# Patient Record
Sex: MALE | Race: WHITE | NOT HISPANIC OR LATINO | URBAN - METROPOLITAN AREA
[De-identification: names, ages, dates, MRNs, and addresses within clinical notes are randomized per-mention and may not be internally consistent; named-entity substitution may affect disease eponyms.]

---

## 2017-05-01 ENCOUNTER — OUTPATIENT (OUTPATIENT)
Dept: OUTPATIENT SERVICES | Facility: HOSPITAL | Age: 77
LOS: 1 days | End: 2017-05-01
Payer: MEDICAID

## 2017-05-03 DIAGNOSIS — R69 ILLNESS, UNSPECIFIED: ICD-10-CM

## 2017-07-01 PROCEDURE — G9001: CPT

## 2022-08-03 ENCOUNTER — INPATIENT (INPATIENT)
Facility: HOSPITAL | Age: 82
LOS: 6 days | Discharge: HOME | DRG: 308 | End: 2022-08-10
Attending: STUDENT IN AN ORGANIZED HEALTH CARE EDUCATION/TRAINING PROGRAM | Admitting: HOSPITALIST
Payer: MEDICARE

## 2022-08-03 VITALS
WEIGHT: 199.96 LBS | RESPIRATION RATE: 18 BRPM | SYSTOLIC BLOOD PRESSURE: 150 MMHG | HEIGHT: 66 IN | HEART RATE: 57 BPM | OXYGEN SATURATION: 95 % | DIASTOLIC BLOOD PRESSURE: 70 MMHG | TEMPERATURE: 98 F

## 2022-08-03 DIAGNOSIS — R00.1 BRADYCARDIA, UNSPECIFIED: ICD-10-CM

## 2022-08-03 LAB
ALBUMIN SERPL ELPH-MCNC: 4.1 G/DL — SIGNIFICANT CHANGE UP (ref 3.3–5)
ALBUMIN SERPL ELPH-MCNC: 4.3 G/DL — SIGNIFICANT CHANGE UP (ref 3.3–5)
ALP SERPL-CCNC: 49 U/L — SIGNIFICANT CHANGE UP (ref 40–120)
ALP SERPL-CCNC: 54 U/L — SIGNIFICANT CHANGE UP (ref 40–120)
ALT FLD-CCNC: 11 U/L — SIGNIFICANT CHANGE UP (ref 10–45)
ALT FLD-CCNC: 13 U/L — SIGNIFICANT CHANGE UP (ref 10–45)
ANION GAP SERPL CALC-SCNC: 11 MMOL/L — SIGNIFICANT CHANGE UP (ref 5–17)
ANION GAP SERPL CALC-SCNC: 8 MMOL/L — SIGNIFICANT CHANGE UP (ref 5–17)
APPEARANCE UR: CLEAR — SIGNIFICANT CHANGE UP
AST SERPL-CCNC: 14 U/L — SIGNIFICANT CHANGE UP (ref 10–40)
AST SERPL-CCNC: 14 U/L — SIGNIFICANT CHANGE UP (ref 10–40)
BACTERIA # UR AUTO: NEGATIVE — SIGNIFICANT CHANGE UP
BASE EXCESS BLDV CALC-SCNC: -1 MMOL/L — SIGNIFICANT CHANGE UP (ref -2–2)
BASOPHILS # BLD AUTO: 0.03 K/UL — SIGNIFICANT CHANGE UP (ref 0–0.2)
BASOPHILS NFR BLD AUTO: 0.4 % — SIGNIFICANT CHANGE UP (ref 0–2)
BILIRUB SERPL-MCNC: 0.3 MG/DL — SIGNIFICANT CHANGE UP (ref 0.2–1.2)
BILIRUB SERPL-MCNC: 0.4 MG/DL — SIGNIFICANT CHANGE UP (ref 0.2–1.2)
BILIRUB UR-MCNC: NEGATIVE — SIGNIFICANT CHANGE UP
BUN SERPL-MCNC: 41 MG/DL — HIGH (ref 7–23)
BUN SERPL-MCNC: 43 MG/DL — HIGH (ref 7–23)
CA-I SERPL-SCNC: 1.24 MMOL/L — SIGNIFICANT CHANGE UP (ref 1.15–1.33)
CALCIUM SERPL-MCNC: 9.2 MG/DL — SIGNIFICANT CHANGE UP (ref 8.4–10.5)
CALCIUM SERPL-MCNC: 9.6 MG/DL — SIGNIFICANT CHANGE UP (ref 8.4–10.5)
CHLORIDE BLDV-SCNC: 107 MMOL/L — SIGNIFICANT CHANGE UP (ref 96–108)
CHLORIDE SERPL-SCNC: 105 MMOL/L — SIGNIFICANT CHANGE UP (ref 96–108)
CHLORIDE SERPL-SCNC: 107 MMOL/L — SIGNIFICANT CHANGE UP (ref 96–108)
CO2 BLDV-SCNC: 26 MMOL/L — SIGNIFICANT CHANGE UP (ref 22–26)
CO2 SERPL-SCNC: 23 MMOL/L — SIGNIFICANT CHANGE UP (ref 22–31)
CO2 SERPL-SCNC: 24 MMOL/L — SIGNIFICANT CHANGE UP (ref 22–31)
COLOR SPEC: SIGNIFICANT CHANGE UP
CREAT SERPL-MCNC: 1.67 MG/DL — HIGH (ref 0.5–1.3)
CREAT SERPL-MCNC: 1.77 MG/DL — HIGH (ref 0.5–1.3)
DIFF PNL FLD: ABNORMAL
EGFR: 38 ML/MIN/1.73M2 — LOW
EGFR: 41 ML/MIN/1.73M2 — LOW
EOSINOPHIL # BLD AUTO: 0.08 K/UL — SIGNIFICANT CHANGE UP (ref 0–0.5)
EOSINOPHIL NFR BLD AUTO: 1.1 % — SIGNIFICANT CHANGE UP (ref 0–6)
EPI CELLS # UR: 0 /HPF — SIGNIFICANT CHANGE UP
GAS PNL BLDV: 138 MMOL/L — SIGNIFICANT CHANGE UP (ref 136–145)
GAS PNL BLDV: SIGNIFICANT CHANGE UP
GAS PNL BLDV: SIGNIFICANT CHANGE UP
GLUCOSE BLDV-MCNC: 106 MG/DL — HIGH (ref 70–99)
GLUCOSE SERPL-MCNC: 104 MG/DL — HIGH (ref 70–99)
GLUCOSE SERPL-MCNC: 113 MG/DL — HIGH (ref 70–99)
GLUCOSE UR QL: NEGATIVE — SIGNIFICANT CHANGE UP
HCO3 BLDV-SCNC: 25 MMOL/L — SIGNIFICANT CHANGE UP (ref 22–29)
HCT VFR BLD CALC: 37.2 % — LOW (ref 39–50)
HCT VFR BLDA CALC: 35 % — LOW (ref 39–51)
HGB BLD CALC-MCNC: 11.5 G/DL — LOW (ref 12.6–17.4)
HGB BLD-MCNC: 12.2 G/DL — LOW (ref 13–17)
HYALINE CASTS # UR AUTO: 0 /LPF — SIGNIFICANT CHANGE UP (ref 0–2)
IMM GRANULOCYTES NFR BLD AUTO: 0.4 % — SIGNIFICANT CHANGE UP (ref 0–1.5)
KETONES UR-MCNC: NEGATIVE — SIGNIFICANT CHANGE UP
LACTATE BLDV-MCNC: 0.7 MMOL/L — SIGNIFICANT CHANGE UP (ref 0.7–2)
LEUKOCYTE ESTERASE UR-ACNC: NEGATIVE — SIGNIFICANT CHANGE UP
LYMPHOCYTES # BLD AUTO: 1.33 K/UL — SIGNIFICANT CHANGE UP (ref 1–3.3)
LYMPHOCYTES # BLD AUTO: 18.9 % — SIGNIFICANT CHANGE UP (ref 13–44)
MAGNESIUM SERPL-MCNC: 2.1 MG/DL — SIGNIFICANT CHANGE UP (ref 1.6–2.6)
MCHC RBC-ENTMCNC: 29.1 PG — SIGNIFICANT CHANGE UP (ref 27–34)
MCHC RBC-ENTMCNC: 32.8 GM/DL — SIGNIFICANT CHANGE UP (ref 32–36)
MCV RBC AUTO: 88.8 FL — SIGNIFICANT CHANGE UP (ref 80–100)
MONOCYTES # BLD AUTO: 0.57 K/UL — SIGNIFICANT CHANGE UP (ref 0–0.9)
MONOCYTES NFR BLD AUTO: 8.1 % — SIGNIFICANT CHANGE UP (ref 2–14)
NEUTROPHILS # BLD AUTO: 4.98 K/UL — SIGNIFICANT CHANGE UP (ref 1.8–7.4)
NEUTROPHILS NFR BLD AUTO: 71.1 % — SIGNIFICANT CHANGE UP (ref 43–77)
NITRITE UR-MCNC: NEGATIVE — SIGNIFICANT CHANGE UP
NRBC # BLD: 0 /100 WBCS — SIGNIFICANT CHANGE UP (ref 0–0)
PCO2 BLDV: 45 MMHG — SIGNIFICANT CHANGE UP (ref 42–55)
PH BLDV: 7.35 — SIGNIFICANT CHANGE UP (ref 7.32–7.43)
PH UR: 6 — SIGNIFICANT CHANGE UP (ref 5–8)
PLATELET # BLD AUTO: 147 K/UL — LOW (ref 150–400)
PO2 BLDV: 36 MMHG — SIGNIFICANT CHANGE UP (ref 25–45)
POTASSIUM BLDV-SCNC: 4.5 MMOL/L — SIGNIFICANT CHANGE UP (ref 3.5–5.1)
POTASSIUM SERPL-MCNC: 4.5 MMOL/L — SIGNIFICANT CHANGE UP (ref 3.5–5.3)
POTASSIUM SERPL-MCNC: 4.8 MMOL/L — SIGNIFICANT CHANGE UP (ref 3.5–5.3)
POTASSIUM SERPL-SCNC: 4.5 MMOL/L — SIGNIFICANT CHANGE UP (ref 3.5–5.3)
POTASSIUM SERPL-SCNC: 4.8 MMOL/L — SIGNIFICANT CHANGE UP (ref 3.5–5.3)
PROT SERPL-MCNC: 6.2 G/DL — SIGNIFICANT CHANGE UP (ref 6–8.3)
PROT SERPL-MCNC: 6.8 G/DL — SIGNIFICANT CHANGE UP (ref 6–8.3)
PROT UR-MCNC: NEGATIVE — SIGNIFICANT CHANGE UP
RBC # BLD: 4.19 M/UL — LOW (ref 4.2–5.8)
RBC # FLD: 12.6 % — SIGNIFICANT CHANGE UP (ref 10.3–14.5)
RBC CASTS # UR COMP ASSIST: 7 /HPF — HIGH (ref 0–4)
SAO2 % BLDV: 62.7 % — LOW (ref 67–88)
SODIUM SERPL-SCNC: 139 MMOL/L — SIGNIFICANT CHANGE UP (ref 135–145)
SODIUM SERPL-SCNC: 139 MMOL/L — SIGNIFICANT CHANGE UP (ref 135–145)
SP GR SPEC: 1.01 — SIGNIFICANT CHANGE UP (ref 1.01–1.02)
UROBILINOGEN FLD QL: NEGATIVE — SIGNIFICANT CHANGE UP
WBC # BLD: 7.02 K/UL — SIGNIFICANT CHANGE UP (ref 3.8–10.5)
WBC # FLD AUTO: 7.02 K/UL — SIGNIFICANT CHANGE UP (ref 3.8–10.5)
WBC UR QL: 1 /HPF — SIGNIFICANT CHANGE UP (ref 0–5)

## 2022-08-03 PROCEDURE — 99285 EMERGENCY DEPT VISIT HI MDM: CPT | Mod: GC

## 2022-08-03 PROCEDURE — 93010 ELECTROCARDIOGRAM REPORT: CPT | Mod: GC

## 2022-08-03 PROCEDURE — 76775 US EXAM ABDO BACK WALL LIM: CPT | Mod: 26

## 2022-08-03 RX ORDER — ATROPINE SULFATE 0.1 MG/ML
0.5 SYRINGE (ML) INJECTION ONCE
Refills: 0 | Status: DISCONTINUED | OUTPATIENT
Start: 2022-08-03 | End: 2022-08-10

## 2022-08-03 RX ORDER — LANOLIN ALCOHOL/MO/W.PET/CERES
3 CREAM (GRAM) TOPICAL AT BEDTIME
Refills: 0 | Status: DISCONTINUED | OUTPATIENT
Start: 2022-08-03 | End: 2022-08-10

## 2022-08-03 RX ORDER — SODIUM CHLORIDE 9 MG/ML
500 INJECTION INTRAMUSCULAR; INTRAVENOUS; SUBCUTANEOUS ONCE
Refills: 0 | Status: COMPLETED | OUTPATIENT
Start: 2022-08-03 | End: 2022-08-03

## 2022-08-03 RX ORDER — ONDANSETRON 8 MG/1
4 TABLET, FILM COATED ORAL EVERY 8 HOURS
Refills: 0 | Status: DISCONTINUED | OUTPATIENT
Start: 2022-08-03 | End: 2022-08-10

## 2022-08-03 RX ORDER — ACETAMINOPHEN 500 MG
650 TABLET ORAL EVERY 6 HOURS
Refills: 0 | Status: DISCONTINUED | OUTPATIENT
Start: 2022-08-03 | End: 2022-08-10

## 2022-08-03 RX ADMIN — SODIUM CHLORIDE 500 MILLILITER(S): 9 INJECTION INTRAMUSCULAR; INTRAVENOUS; SUBCUTANEOUS at 23:04

## 2022-08-03 NOTE — ED PROVIDER NOTE - PROGRESS NOTE DETAILS
James Yin M.D. (PGY-1): pt seen by urology. casas placed. dirused 1 L. Pt observed after this. сергей down to the high 30s about 30 min after casas placement. no symptoms. sinus сергей on monitor and on ecg. e- panel repeated, unremarkable. troponin unremarkable. admission to medicine for bradycardia of unknown origin.

## 2022-08-03 NOTE — PROCEDURE NOTE - ADDITIONAL PROCEDURE DETAILS
Urology called after unsuccessful casas attempts. On exam pt noted to have a very tight pin point phimosis. Gently dilated with urojet and successfully placed a 12f silicone casas. 1100cc of clear yellow urine drained.    - Monitor for post obstructive diuresis (urine output >200/hr for 3 hours)  - If POD, Repeat BMP q6 hours and replete electrolytes as needed  - Drink to thirst, place 2 liters of water at bedside at all times  - Otherwise may follow up with his urologist Dr. Ovalles who recommended circumcision a few years ago.   - Home with casas, outpatient TOV with Dr. Ovalles

## 2022-08-03 NOTE — ED PROVIDER NOTE - ATTENDING APP SHARED VISIT CONTRIBUTION OF CARE
I, Thomas Oconnro, performed a history and physical exam of the patient and discussed their management with the resident and/or advanced care provider. I reviewed the resident and/or advanced care provider's note and agree with the documented findings and plan of care except where noted. I was present and available for all procedures.     agree with above. I wrote MDM

## 2022-08-03 NOTE — ED PROVIDER NOTE - CARE PLAN
1 Principal Discharge DX:	Urinary retention   Principal Discharge DX:	Bradycardia   Principal Discharge DX:	Bradycardia  Secondary Diagnosis:	Urinary retention

## 2022-08-03 NOTE — ED PROVIDER NOTE - PHYSICAL EXAMINATION
GEN: Pt in NAD, non-toxic, appears uncomfortable.  PSYCH: Affect appropriate.  EYES: Sclera white w/o injection.   ENT: Head NCAT. MMM. Neck supple FROM.  RESP: CTA b/l, no wheezes, rales, or rhonchi.   CARDIAC: RRR, clear distinct S1, S2, no appreciable murmurs.  ABD: Abdomen soft, +fullness of suprapubic region with mild ttp. No CVAT b/l.  VASC: 2+ radial and dorsalis pedis pulses b/l. No edema or calf tenderness.  SKIN: No rashes on the trunk. PHYSICAL EXAM:  GENERAL: non-toxic appearing; in no respiratory distress  HEAD Atraumatic, Normocephalic  NECK: No JVD; trachea midline  EYES: PERRL, EOMs intact b/l w/out deficits; normal conjunctiva  CHEST/LUNG: CTAB no wheezes/rhonchi/rales  HEART: RRR no murmur/gallops/rubs  ABDOMEN: soft, mildly distended; surpapubic fullness but no tenderness; no cva tenderness  EXTREMITIES: No LE edema, +2 radial pulses b/l, +2 DP/PT pulses b/l  MUSCULOSKELETAL: FROM of all 4 extremities;  NERVOUS SYSTEM:  A&Ox3, No motor deficits or sensory deficits; CNII-XII intact; Speech is fluent and appropriate  SKIN:  Warm and dry as visualized

## 2022-08-03 NOTE — ED PROVIDER NOTE - NSTIMEPROVIDERCAREINITIATE_GEN_ER
I would be ok with either sending her to PT - can sign existing order. Or seeing her in same day slot to try Epley maneuver myself and see if we can get her some relief.  Can do meclizine OTC   03-Aug-2022 18:04

## 2022-08-03 NOTE — ED PROVIDER NOTE - NSFOLLOWUPINSTRUCTIONS_ED_ALL_ED_FT
1) Follow-up with your primary care provider in 1-2 days.      Follow-up with urology in 1 week.    2) Continue to take all medications as prescribed.      Read attached discharge information on leg bag.    3) Rest and drink plenty of fluids.    4) Return to the ER for any new or worsening symptoms, difficulty passing urine through the catheter, bloody urine, fever, chills, abdominal pain, or if any other concerns. 1) Follow-up with your primary care provider in 1-2 days.      Follow-up with urology in 1 week.    Additionally, you had an ultrasound in the emergency room of the kidney and bladder. Please bring the results of this ultrasound with you to discuss with your PCP. It is recommended that you have an outpatient ultrasound or MRI to further evaluate the right kidney.    2) Continue to take all medications as prescribed.      Read attached discharge information on leg bag.    3) Rest and drink plenty of fluids.    4) Return to the ER for any new or worsening symptoms, difficulty passing urine through the catheter, bloody urine, fever, chills, abdominal pain, or if any other concerns.

## 2022-08-03 NOTE — ED ADULT NURSE REASSESSMENT NOTE - NS ED NURSE REASSESS COMMENT FT1
Verified patient's identify with two forms of patient identification (correct spelling of full name and date of birth). Patient evaluated by "[facility]" provider. Laboratory specimen ordered and obtained via peripheral IV access device. Laboratory specimen sent to laboratory. 18 gauge peripheral IV access device placed to the LEFT AC by AUDREY Guy RN. Peripheral IV access device tolerated well, positive blood return, flushing without resistance, and secured with securement device, transparent dressing, and tape.

## 2022-08-03 NOTE — ED PROVIDER NOTE - CLINICAL SUMMARY MEDICAL DECISION MAKING FREE TEXT BOX
Thomas Oconnor MD. pt with cad s/p cabg, prostate CA s/p RT >10 years ago, presents for urinary retention since 4am today with surpapubic discomfort. pt appeasr uncomfroatlbe likely 2/2 urinary retnetion. pt with surpaubic fullness but no tenderness. no cva tenderness. lungs cta. pocus showing at least 550cc of urine retained in bladder. will place casas, chedck labs for bran, ua to eval for infection, reassess.    progress note: notified by RN that pt has pinpoint urethral opening. resident and I evaluated and attempted to pass casas. unable to and thus, urology was consulted. they were able to pass a casas in and immediately, about 1.1L of urine was drained with relief of pt's discomfort. while evaluating the pt, pt noted to be bradycardic to the 30s. placed on pads. pt maintaining BPs and is asymptoamtic. unclear etiology however, possibly post-diuresis related vs lyte abrnomatlies. 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

## 2022-08-03 NOTE — ED PROVIDER NOTE - RAPID ASSESSMENT
81 y/o M w/ PMHx HTN, prediabetes, CABG and prostate CA, presents to the ED c/o urinary retention w/ pain x1day. Last urine output was at 4AM. No hx of prior catheterizations.  Pt has been taking Tylenol for relief. Denies any other acute complaints.     Silvana MELARA ([employer]) have documented this rapid assessment note under the dictation of Marsha Saunders (PA) which has been reviewed and affirmed to be accurate. Patient was seen as a QPA patient. The patient will be seen and further worked up in the main emergency department and their care will be completed by the main emergency department team along with a thorough physical exam. Receiving team will follow up on labs, analgesia, any clinical imaging, reassess and disposition as clinically indicated, all decisions regarding the progression of care will be made at their discretion. 83 y/o M w/ PMHx HTN, prediabetes, CABG and prostate CA, presents to the ED c/o urinary retention w/ pain x1day. Last urine output was at 4AM. No hx of prior urinary catheterizations.  Pt has been taking Tylenol for relief. Feels pressure to his lower abdomen. Has been eating/drinking normally today. No other complaints. No fevers/chills.     Silvana MELARA (Real) have documented this rapid assessment note under the dictation of Marsha Saunders (PA) which has been reviewed and affirmed to be accurate. Patient was seen as a QPA patient. The patient will be seen and further worked up in the main emergency department and their care will be completed by the main emergency department team along with a thorough physical exam. Receiving team will follow up on labs, analgesia, any clinical imaging, reassess and disposition as clinically indicated, all decisions regarding the progression of care will be made at their discretion.    Marsha MELARA PA-C, personally performed the service described in the documentation recorded by the scribe in my presence, and it accurately and completely records my words and actions.

## 2022-08-03 NOTE — ED PROVIDER NOTE - OBJECTIVE STATEMENT
83 y/o M w/ PMHx HTN, prediabetes, CAD s/p CABG, and  remote hx of prostate CA >10 yrs ago s/p RT, presents to the ED c/o urinary retention w/ pain x1day. Last urine output was at 4AM - several drops. No hx of prior urinary catheterizations.  Pt has been taking Tylenol for relief. Pt reports pressure in the lower abdomen. Pt denies CP, SOB, n/v, trauma, recent illness or urinary sxs. Pt eating and drinking normally today, ate watermelon.

## 2022-08-03 NOTE — ED ADULT NURSE REASSESSMENT NOTE - NS ED NURSE REASSESS COMMENT FT1
Munoz attempted and unable to pass due to closure of meatus and inability to retract foreskin. MD García attempted and unable, urology called.

## 2022-08-04 DIAGNOSIS — I10 ESSENTIAL (PRIMARY) HYPERTENSION: ICD-10-CM

## 2022-08-04 DIAGNOSIS — I25.10 ATHEROSCLEROTIC HEART DISEASE OF NATIVE CORONARY ARTERY WITHOUT ANGINA PECTORIS: ICD-10-CM

## 2022-08-04 DIAGNOSIS — E78.5 HYPERLIPIDEMIA, UNSPECIFIED: ICD-10-CM

## 2022-08-04 DIAGNOSIS — N13.9 OBSTRUCTIVE AND REFLUX UROPATHY, UNSPECIFIED: ICD-10-CM

## 2022-08-04 DIAGNOSIS — I49.8 OTHER SPECIFIED CARDIAC ARRHYTHMIAS: ICD-10-CM

## 2022-08-04 DIAGNOSIS — N18.30 CHRONIC KIDNEY DISEASE, STAGE 3 UNSPECIFIED: ICD-10-CM

## 2022-08-04 LAB
ALBUMIN SERPL ELPH-MCNC: 4 G/DL — SIGNIFICANT CHANGE UP (ref 3.3–5)
ALP SERPL-CCNC: 51 U/L — SIGNIFICANT CHANGE UP (ref 40–120)
ALT FLD-CCNC: 13 U/L — SIGNIFICANT CHANGE UP (ref 10–45)
ANION GAP SERPL CALC-SCNC: 11 MMOL/L — SIGNIFICANT CHANGE UP (ref 5–17)
AST SERPL-CCNC: 15 U/L — SIGNIFICANT CHANGE UP (ref 10–40)
BASOPHILS # BLD AUTO: 0.04 K/UL — SIGNIFICANT CHANGE UP (ref 0–0.2)
BASOPHILS NFR BLD AUTO: 0.7 % — SIGNIFICANT CHANGE UP (ref 0–2)
BILIRUB SERPL-MCNC: 0.4 MG/DL — SIGNIFICANT CHANGE UP (ref 0.2–1.2)
BUN SERPL-MCNC: 37 MG/DL — HIGH (ref 7–23)
CALCIUM SERPL-MCNC: 9.4 MG/DL — SIGNIFICANT CHANGE UP (ref 8.4–10.5)
CHLORIDE SERPL-SCNC: 108 MMOL/L — SIGNIFICANT CHANGE UP (ref 96–108)
CO2 SERPL-SCNC: 25 MMOL/L — SIGNIFICANT CHANGE UP (ref 22–31)
CREAT ?TM UR-MCNC: 71 MG/DL — SIGNIFICANT CHANGE UP
CREAT SERPL-MCNC: 1.43 MG/DL — HIGH (ref 0.5–1.3)
CULTURE RESULTS: NO GROWTH — SIGNIFICANT CHANGE UP
EGFR: 49 ML/MIN/1.73M2 — LOW
EOSINOPHIL # BLD AUTO: 0.11 K/UL — SIGNIFICANT CHANGE UP (ref 0–0.5)
EOSINOPHIL NFR BLD AUTO: 1.8 % — SIGNIFICANT CHANGE UP (ref 0–6)
GLUCOSE SERPL-MCNC: 99 MG/DL — SIGNIFICANT CHANGE UP (ref 70–99)
HCT VFR BLD CALC: 35.9 % — LOW (ref 39–50)
HGB BLD-MCNC: 12 G/DL — LOW (ref 13–17)
IMM GRANULOCYTES NFR BLD AUTO: 0.3 % — SIGNIFICANT CHANGE UP (ref 0–1.5)
LYMPHOCYTES # BLD AUTO: 1.47 K/UL — SIGNIFICANT CHANGE UP (ref 1–3.3)
LYMPHOCYTES # BLD AUTO: 24.3 % — SIGNIFICANT CHANGE UP (ref 13–44)
MCHC RBC-ENTMCNC: 29.3 PG — SIGNIFICANT CHANGE UP (ref 27–34)
MCHC RBC-ENTMCNC: 33.4 GM/DL — SIGNIFICANT CHANGE UP (ref 32–36)
MCV RBC AUTO: 87.8 FL — SIGNIFICANT CHANGE UP (ref 80–100)
MONOCYTES # BLD AUTO: 0.53 K/UL — SIGNIFICANT CHANGE UP (ref 0–0.9)
MONOCYTES NFR BLD AUTO: 8.7 % — SIGNIFICANT CHANGE UP (ref 2–14)
NEUTROPHILS # BLD AUTO: 3.89 K/UL — SIGNIFICANT CHANGE UP (ref 1.8–7.4)
NEUTROPHILS NFR BLD AUTO: 64.2 % — SIGNIFICANT CHANGE UP (ref 43–77)
NRBC # BLD: 0 /100 WBCS — SIGNIFICANT CHANGE UP (ref 0–0)
OSMOLALITY UR: 454 MOS/KG — SIGNIFICANT CHANGE UP (ref 300–900)
PLATELET # BLD AUTO: 130 K/UL — LOW (ref 150–400)
POTASSIUM SERPL-MCNC: 4.4 MMOL/L — SIGNIFICANT CHANGE UP (ref 3.5–5.3)
POTASSIUM SERPL-SCNC: 4.4 MMOL/L — SIGNIFICANT CHANGE UP (ref 3.5–5.3)
PROT ?TM UR-MCNC: 12 MG/DL — SIGNIFICANT CHANGE UP (ref 0–12)
PROT SERPL-MCNC: 6.5 G/DL — SIGNIFICANT CHANGE UP (ref 6–8.3)
PROT/CREAT UR-RTO: 0.2 RATIO — SIGNIFICANT CHANGE UP (ref 0–0.2)
RBC # BLD: 4.09 M/UL — LOW (ref 4.2–5.8)
RBC # FLD: 12.5 % — SIGNIFICANT CHANGE UP (ref 10.3–14.5)
SARS-COV-2 RNA SPEC QL NAA+PROBE: SIGNIFICANT CHANGE UP
SODIUM SERPL-SCNC: 144 MMOL/L — SIGNIFICANT CHANGE UP (ref 135–145)
SODIUM UR-SCNC: 72 MMOL/L — SIGNIFICANT CHANGE UP
SPECIMEN SOURCE: SIGNIFICANT CHANGE UP
TSH SERPL-MCNC: 3.89 UIU/ML — SIGNIFICANT CHANGE UP (ref 0.27–4.2)
UUN UR-MCNC: 680 MG/DL — SIGNIFICANT CHANGE UP
WBC # BLD: 6.06 K/UL — SIGNIFICANT CHANGE UP (ref 3.8–10.5)
WBC # FLD AUTO: 6.06 K/UL — SIGNIFICANT CHANGE UP (ref 3.8–10.5)

## 2022-08-04 PROCEDURE — 93306 TTE W/DOPPLER COMPLETE: CPT | Mod: 26

## 2022-08-04 PROCEDURE — 76770 US EXAM ABDO BACK WALL COMP: CPT | Mod: 26

## 2022-08-04 PROCEDURE — 99223 1ST HOSP IP/OBS HIGH 75: CPT

## 2022-08-04 RX ORDER — ROSUVASTATIN CALCIUM 5 MG/1
1 TABLET ORAL
Qty: 0 | Refills: 0 | DISCHARGE

## 2022-08-04 RX ORDER — DOXAZOSIN MESYLATE 4 MG
1 TABLET ORAL
Qty: 0 | Refills: 0 | DISCHARGE

## 2022-08-04 RX ORDER — VALSARTAN 80 MG/1
160 TABLET ORAL DAILY
Refills: 0 | Status: DISCONTINUED | OUTPATIENT
Start: 2022-08-04 | End: 2022-08-05

## 2022-08-04 RX ORDER — ASPIRIN/CALCIUM CARB/MAGNESIUM 324 MG
81 TABLET ORAL DAILY
Refills: 0 | Status: DISCONTINUED | OUTPATIENT
Start: 2022-08-04 | End: 2022-08-10

## 2022-08-04 RX ORDER — ATORVASTATIN CALCIUM 80 MG/1
80 TABLET, FILM COATED ORAL AT BEDTIME
Refills: 0 | Status: DISCONTINUED | OUTPATIENT
Start: 2022-08-04 | End: 2022-08-10

## 2022-08-04 RX ORDER — ARIPIPRAZOLE 15 MG/1
5 TABLET ORAL DAILY
Refills: 0 | Status: DISCONTINUED | OUTPATIENT
Start: 2022-08-04 | End: 2022-08-10

## 2022-08-04 RX ORDER — CHLORTHALIDONE 50 MG
25 TABLET ORAL DAILY
Refills: 0 | Status: DISCONTINUED | OUTPATIENT
Start: 2022-08-04 | End: 2022-08-05

## 2022-08-04 RX ORDER — ASPIRIN/CALCIUM CARB/MAGNESIUM 324 MG
1 TABLET ORAL
Qty: 0 | Refills: 0 | DISCHARGE

## 2022-08-04 RX ORDER — POLYETHYLENE GLYCOL 3350 17 G/17G
17 POWDER, FOR SOLUTION ORAL DAILY
Refills: 0 | Status: DISCONTINUED | OUTPATIENT
Start: 2022-08-04 | End: 2022-08-10

## 2022-08-04 RX ORDER — AMLODIPINE BESYLATE 2.5 MG/1
5 TABLET ORAL DAILY
Refills: 0 | Status: DISCONTINUED | OUTPATIENT
Start: 2022-08-04 | End: 2022-08-04

## 2022-08-04 RX ORDER — ENOXAPARIN SODIUM 100 MG/ML
40 INJECTION SUBCUTANEOUS EVERY 24 HOURS
Refills: 0 | Status: DISCONTINUED | OUTPATIENT
Start: 2022-08-04 | End: 2022-08-08

## 2022-08-04 RX ORDER — ARIPIPRAZOLE 15 MG/1
1 TABLET ORAL
Qty: 0 | Refills: 0 | DISCHARGE

## 2022-08-04 RX ORDER — DOXAZOSIN MESYLATE 4 MG
4 TABLET ORAL AT BEDTIME
Refills: 0 | Status: DISCONTINUED | OUTPATIENT
Start: 2022-08-04 | End: 2022-08-10

## 2022-08-04 RX ADMIN — Medication 81 MILLIGRAM(S): at 13:19

## 2022-08-04 RX ADMIN — SODIUM CHLORIDE 500 MILLILITER(S): 9 INJECTION INTRAMUSCULAR; INTRAVENOUS; SUBCUTANEOUS at 00:37

## 2022-08-04 RX ADMIN — ARIPIPRAZOLE 5 MILLIGRAM(S): 15 TABLET ORAL at 13:21

## 2022-08-04 RX ADMIN — ATORVASTATIN CALCIUM 80 MILLIGRAM(S): 80 TABLET, FILM COATED ORAL at 21:44

## 2022-08-04 RX ADMIN — AMLODIPINE BESYLATE 5 MILLIGRAM(S): 2.5 TABLET ORAL at 06:43

## 2022-08-04 RX ADMIN — ENOXAPARIN SODIUM 40 MILLIGRAM(S): 100 INJECTION SUBCUTANEOUS at 13:20

## 2022-08-04 RX ADMIN — Medication 4 MILLIGRAM(S): at 21:44

## 2022-08-04 RX ADMIN — Medication 0.2 MILLIGRAM(S): at 06:41

## 2022-08-04 RX ADMIN — Medication 25 MILLIGRAM(S): at 06:43

## 2022-08-04 RX ADMIN — VALSARTAN 160 MILLIGRAM(S): 80 TABLET ORAL at 06:41

## 2022-08-04 NOTE — H&P ADULT - PROBLEM SELECTOR PLAN 3
baseline Cr 1.6-1.8  Monitor UO, BUN/Cr, volume status, acid-base balance, electrolytes   urine studies (UA, Cr, Lytes, Osm) + calculate FENa/FEUrea, renal/bladder US   avoid nephrotoxic agents; appropriate dose adjustments for all renally cleared medications   judicious IVF + electrolyte supplementation as needed to maintain goal

## 2022-08-04 NOTE — PROVIDER CONTACT NOTE (OTHER) - RECOMMENDATIONS
Notify ACP of sustaining HR. Provider to RN for R2 pads on pt. Continue to monitor pt
FRANTZ Yao made aware

## 2022-08-04 NOTE — H&P ADULT - PROBLEM SELECTOR PLAN 4
s/p CABG 7 yrs ago  asymptomatic, trop negative, no ekg findings suggestive of acute ischemia  continue home asa, statin; hold AVN blockers for now given bradyarrhythmia

## 2022-08-04 NOTE — PROVIDER CONTACT NOTE (OTHER) - ASSESSMENT
Pt remains asymptomatic with no changes in mentation.
pt. HR 40s on tele. Pt is A&O X4. denies chest pain/ SOB/ discomfort. BP, temp, and O2 WNL.

## 2022-08-04 NOTE — H&P ADULT - NSHPREVIEWOFSYSTEMS_GEN_ALL_CORE
CONSTITUTIONAL: No fever. no weakness  ENMT:  No sinus or throat pain  RESPIRATORY: No cough, wheezing, chills or hemoptysis; No shortness of breath  CARDIOVASCULAR: No chest pain, palpitations, dizziness, or leg swelling  GASTROINTESTINAL: No abdominal or epigastric pain. No nausea, vomiting, or hematemesis; No diarrhea or constipation. No melena or hematochezia.  GENITOURINARY: No dysuria or incontinence  NEUROLOGICAL: No headaches, memory loss, loss of strength, numbness, or tremors  SKIN: No rashes,  No hives or eczema  ENDOCRINE: No heat or cold intolerance; No hair loss  MUSCULOSKELETAL: No joint pain or swelling; No muscle, back, or extremity pain  PSYCHIATRIC: No depression, anxiety, mood swings, or difficulty sleeping  HEME/LYMPH: No easy bruising, or bleeding gums; no enlarged LN

## 2022-08-04 NOTE — H&P ADULT - NSHPPHYSICALEXAM_GEN_ALL_CORE
T(C): 36.4 (08-04-22 @ 00:15), Max: 36.9 (08-03-22 @ 17:48)  HR: 45 (08-04-22 @ 00:15) (45 - 57)  BP: 155/74 (08-04-22 @ 00:15) (150/70 - 155/74)  RR: 17 (08-04-22 @ 00:15) (17 - 18)  SpO2: 95% (08-04-22 @ 00:15) (95% - 95%)  GENERAL: NAD, lying in bed comfortably  EYES: EOMI, PERRLA; conjunctiva and sclera clear  ENMT: Moist oral mucosa, no pharyngeal injection or exudates   NECK: Supple, no palpable masses; no JVD  RESPIRATORY: Normal respiratory effort; lungs are clear to auscultation bilaterally  CARDIOVASCULAR: Regular rate and rhythm, normal S1 and S2, no murmur/rub/gallop; No lower extremity edema; Peripheral pulses are 2+ bilaterally  ABDOMEN: Nontender to palpation, normoactive bowel sounds, no rebound/guarding; casas catheter in place, putting out straw colored urine  MUSCULOSKELETAL:  Normal gait; no clubbing or cyanosis of digits; no joint swelling or tenderness to palpation  PSYCH: A+O to person, place, and time; affect appropriate  NEUROLOGY: CN 2-12 are intact and symmetric; no gross motor or sensory deficits   SKIN: No rashes; no palpable lesions

## 2022-08-04 NOTE — H&P ADULT - NSHPADDITIONALINFOADULT_GEN_ALL_CORE
full code  activity as tolerated  cardiac/renal diet  vte ppx with lmwh    please contact wife, SINDHU @ [phone] +/or daughter, MICHA @ [phone] with updates in AM; please pass on contact info to cards/ep as well, as they would like to speak to them as well

## 2022-08-04 NOTE — CHART NOTE - NSCHARTNOTEFT_GEN_A_CORE
pt seen and examined  wife at bedside  pt feeling well    MEDICATIONS  (STANDING):  amLODIPine   Tablet 5 milliGRAM(s) Oral daily  ARIPiprazole 5 milliGRAM(s) Oral daily  aspirin  chewable 81 milliGRAM(s) Oral daily  atorvastatin 80 milliGRAM(s) Oral at bedtime  chlorthalidone 25 milliGRAM(s) Oral daily  doxazosin 4 milliGRAM(s) Oral at bedtime  enoxaparin Injectable 40 milliGRAM(s) SubCutaneous every 24 hours  polyethylene glycol 3350 17 Gram(s) Oral daily  valsartan 160 milliGRAM(s) Oral daily    MEDICATIONS  (PRN):  acetaminophen     Tablet .. 650 milliGRAM(s) Oral every 6 hours PRN Temp greater or equal to 38C (100.4F), Mild Pain (1 - 3)  aluminum hydroxide/magnesium hydroxide/simethicone Suspension 30 milliLiter(s) Oral every 4 hours PRN Dyspepsia  atropine Injectable 0.5 milliGRAM(s) IV Push once PRN symptomatic with HR<30, hemodynamically unstable with HR<30  melatonin 3 milliGRAM(s) Oral at bedtime PRN Insomnia  ondansetron Injectable 4 milliGRAM(s) IV Push every 8 hours PRN Nausea and/or Vomiting          T(C): 36.4 (08-04 @ 16:37), Max: 36.9 (08-03 @ 17:48)   HR: 50   BP: 147/72   RR: 16   SpO2: 96%    PHYSICAL EXAM:    CONSTITUTIONAL: NAD, well-developed, well-groomed  EYES: PERRLA; conjunctiva and sclera clear  ENMT: Moist oral mucosa, no pharyngeal injection or exudates; normal dentition  NECK: Supple, no palpable masses; no thyromegaly  RESPIRATORY: Normal respiratory effort; lungs are clear to auscultation bilaterally  CARDIOVASCULAR: Regular rate and rhythm, normal S1 and S2, no murmur/rub/gallop; No lower extremity edema; Peripheral pulses are 2+ bilaterally  ABDOMEN: Nontender to palpation, normoactive bowel sounds, no rebound/guarding; No hepatosplenomegaly  MUSCULOSKELETAL:  Normal gait; no clubbing or cyanosis of digits; no joint swelling or tenderness to palpation  PSYCH: A+O to person, place, and time; affect appropriate  NEUROLOGY: CN 2-12 are intact and symmetric; no gross sensory deficits   SKIN: No rashes; no palpable lesions      LABS:                        12.0   6.06  )-----------( 130      ( 04 Aug 2022 06:36 )             35.9      08-04    144  |  108  |  37<H>  ----------------------------<  99  4.4   |  25  |  1.43<H>    Ca    9.4      04 Aug 2022 06:36  Mg     2.1     08-03    TPro  6.5  /  Alb  4.0  /  TBili  0.4  /  DBili  x   /  AST  15  /  ALT  13  /  AlkPhos  51  08-04    82M pmh htn, hld, cad s/p cabg, ckd (baseline cr 1.6-1.8), prostate ca p/w penile pain, found to have obstructive uropathy, relieved with casas insertion but then found to have bradyarrhythmia with HR as low as 30/min while asymptomatic and hemodynamically stable, admitted to medicine for monitoring and further mgmt.     Problem/Plan - 1:  ·  Problem: Obstructive uropathy.   ·  Plan: s/p casas insertion with resolution of pain  continue outpatient doxazosin 4  outpatient urology follow up for TOV.  will need a referral to the [facility] for urology because patient's urologist will be away for the next 2 weeks     Problem/Plan - 2:  ·  Problem: Bradyarrhythmia.   hr are improving now in the 40 - 50's bmp  will hold clonidine as this can also cause bradycardia  Cards eval appreciated  will f/u tte  will monitor HR overnight and if HR continues to improve then will  plan for discharge in the tomorrow. pt seen and examined  wife at bedside  pt feeling well    MEDICATIONS  (STANDING):  amLODIPine   Tablet 5 milliGRAM(s) Oral daily  ARIPiprazole 5 milliGRAM(s) Oral daily  aspirin  chewable 81 milliGRAM(s) Oral daily  atorvastatin 80 milliGRAM(s) Oral at bedtime  chlorthalidone 25 milliGRAM(s) Oral daily  doxazosin 4 milliGRAM(s) Oral at bedtime  enoxaparin Injectable 40 milliGRAM(s) SubCutaneous every 24 hours  polyethylene glycol 3350 17 Gram(s) Oral daily  valsartan 160 milliGRAM(s) Oral daily    MEDICATIONS  (PRN):  acetaminophen     Tablet .. 650 milliGRAM(s) Oral every 6 hours PRN Temp greater or equal to 38C (100.4F), Mild Pain (1 - 3)  aluminum hydroxide/magnesium hydroxide/simethicone Suspension 30 milliLiter(s) Oral every 4 hours PRN Dyspepsia  atropine Injectable 0.5 milliGRAM(s) IV Push once PRN symptomatic with HR<30, hemodynamically unstable with HR<30  melatonin 3 milliGRAM(s) Oral at bedtime PRN Insomnia  ondansetron Injectable 4 milliGRAM(s) IV Push every 8 hours PRN Nausea and/or Vomiting          T(C): 36.4 (08-04 @ 16:37), Max: 36.9 (08-03 @ 17:48)   HR: 50   BP: 147/72   RR: 16   SpO2: 96%    PHYSICAL EXAM:    CONSTITUTIONAL: NAD, well-developed, well-groomed  EYES: PERRLA; conjunctiva and sclera clear  ENMT: Moist oral mucosa, no pharyngeal injection or exudates; normal dentition  NECK: Supple, no palpable masses; no thyromegaly  RESPIRATORY: Normal respiratory effort; lungs are clear to auscultation bilaterally  CARDIOVASCULAR: Regular rate and rhythm, normal S1 and S2, no murmur/rub/gallop; No lower extremity edema; Peripheral pulses are 2+ bilaterally  ABDOMEN: Nontender to palpation, normoactive bowel sounds, no rebound/guarding; No hepatosplenomegaly  MUSCULOSKELETAL:  Normal gait; no clubbing or cyanosis of digits; no joint swelling or tenderness to palpation  PSYCH: A+O to person, place, and time; affect appropriate  NEUROLOGY: CN 2-12 are intact and symmetric; no gross sensory deficits   SKIN: No rashes; no palpable lesions      LABS:                        12.0   6.06  )-----------( 130      ( 04 Aug 2022 06:36 )             35.9      08-04    144  |  108  |  37<H>  ----------------------------<  99  4.4   |  25  |  1.43<H>    Ca    9.4      04 Aug 2022 06:36  Mg     2.1     08-03    TPro  6.5  /  Alb  4.0  /  TBili  0.4  /  DBili  x   /  AST  15  /  ALT  13  /  AlkPhos  51  08-04    82M pmh htn, hld, cad s/p cabg, ckd (baseline cr 1.6-1.8), prostate ca p/w penile pain, found to have obstructive uropathy, relieved with casas insertion but then found to have bradyarrhythmia with HR as low as 30/min while asymptomatic and hemodynamically stable, admitted to medicine for monitoring and further mgmt.     Problem/Plan - 1:  ·  Problem: Obstructive uropathy.   ·  Plan: s/p casas insertion with resolution of pain    continue outpatient doxazosin 4  outpatient urology follow up for TOV.  will need a referral to the [facility] for urology because patient's urologist will be away for the next 2 weeks    LOVE   improved after casas placement  Creatinine Trend: 1.43<--, 1.67<--, 1.77<--     Problem/Plan - 2:  ·  Problem: Bradyarrhythmia.   hr are improving now in the 40 - 50's bmp  will hold clonidine as this can also cause bradycardia  Cards eval appreciated  will f/u tte  will monitor HR overnight and if HR continues to improve then will  plan for discharge in the tomorrow.

## 2022-08-04 NOTE — PATIENT PROFILE ADULT - LEGAL HELP
Pt referred to VA Kristy Jacobson RN at [facility] heart.  Kristy states that she will call the pt (or the pt's friend, Prachi) and have them call to schedule US and consult for one month.    Cherry Ramos RN BSN       no

## 2022-08-04 NOTE — H&P ADULT - PROBLEM SELECTOR PLAN 2
asymptomatic, hemodynamically stable  lowest documented rate of 30/min, fluctuating between 40-60 on encounter  EKG shows LAFB + RBBB ie bifascicular block; no ST seg - T wave changes suggestive of acute ischemia, no prior EKGs to compare  1st troponin not elevated  obtain tsh, tte  observe for symptoms, for features of hemodynamic instability  monitor for EKG/Telemetry changes  at bedside, transcutaneous pacing + atropine 0.5 mg q3-5 mins as needed if symptomatic/with hemodynamic instability with HR <30  hold avn blockers for now  EP consult in AM

## 2022-08-04 NOTE — H&P ADULT - ASSESSMENT
81 yo m w pmh htn, hld, cad s/p cabg, ckd (baseline cr 1.6-1.8), prostate ca p/w penile pain, found to have obstructive uropathy, relieved with casas insertion but then found to have bradyarrhythmia with HR as low as 30/min while asymptomatic and hemodynamically stable, admitted to medicine for further mgmt.

## 2022-08-04 NOTE — CONSULT NOTE ADULT - SUBJECTIVE AND OBJECTIVE BOX
DATE OF SERVICE: 08-04-22 @ 15:03    CHIEF COMPLAINT:Patient is a 82y old  Male who presents with a chief complaint of penile pain, unable to pass urine (04 Aug 2022 01:17)      HISTORY OF PRESENT ILLNESS:  83 yo m w pmh htn, hld, cad s/p cabg, ckd (baseline cr 1.6-1.8), prostate ca p/w "penile pain" that began all of a sudden this morning. sharp, 10/10 pain in intensity. patient has never had such a pain before, denies it radiating to suprapubic or flank or lower back regions. states pain was associated with no urine output. as pain persisted, patient grew concerned, and made his way to ER for further mgmt.   in ER, found to have urinary retention with bladder distended from ~500 cc urine; casas inserted with immediate resolution of pain.  patient was being prepped for discharge from ER with casas, to follow up outpatient urology for further mgmt, however, HR noted to be as low as 30/min. 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 ER did not feel comfortable discharging patient, so plan made to admit to medicine for further evaluation (04 Aug 2022 01:17)      PAST MEDICAL & SURGICAL HISTORY:  Hypertension      Hyperlipemia      S/P CABG (coronary artery bypass graft)              MEDICATIONS:  amLODIPine   Tablet 5 milliGRAM(s) Oral daily  aspirin  chewable 81 milliGRAM(s) Oral daily  chlorthalidone 25 milliGRAM(s) Oral daily  doxazosin 4 milliGRAM(s) Oral at bedtime  enoxaparin Injectable 40 milliGRAM(s) SubCutaneous every 24 hours  valsartan 160 milliGRAM(s) Oral daily        acetaminophen     Tablet .. 650 milliGRAM(s) Oral every 6 hours PRN  ARIPiprazole 5 milliGRAM(s) Oral daily  melatonin 3 milliGRAM(s) Oral at bedtime PRN  ondansetron Injectable 4 milliGRAM(s) IV Push every 8 hours PRN    aluminum hydroxide/magnesium hydroxide/simethicone Suspension 30 milliLiter(s) Oral every 4 hours PRN  atropine Injectable 0.5 milliGRAM(s) IV Push once PRN    atorvastatin 80 milliGRAM(s) Oral at bedtime        FAMILY HISTORY:      Non-contributory    SOCIAL HISTORY:    [ ] Tobacco- not a former or current smoker  [ ] Drugs  [ ] Alcohol    Allergies    No Known Allergies    Intolerances    	    REVIEW OF SYSTEMS:  CONSTITUTIONAL: No fever  EYES: No eye pain, visual disturbances, or discharge  ENMT:  No difficulty hearing, tinnitus  NECK: No pain or stiffness  RESPIRATORY: No cough, wheezing,  CARDIOVASCULAR: No chest pain, palpitations, passing out, dizziness, or leg swelling  GASTROINTESTINAL:  No nausea, vomiting, diarrhea or constipation. No melena.  GENITOURINARY: retention and penile pain  NEUROLOGICAL: No stroke like symptoms  SKIN: No burning or lesions   ENDOCRINE: No heat or cold intolerance  MUSCULOSKELETAL: No joint pain or swelling  PSYCHIATRIC: No  anxiety, mood swings  HEME/LYMPH: No bleeding gums  ALLERGY AND IMMUNOLOGIC: No hives or eczema	    All other ROS negative    PHYSICAL EXAM:  T(C): 36.4 (08-04-22 @ 10:20), Max: 36.9 (08-03-22 @ 17:48)  HR: 68 (08-04-22 @ 13:25) (43 - 68)  BP: 133/61 (08-04-22 @ 10:20) (133/61 - 178/72)  RR: 16 (08-04-22 @ 10:20) (16 - 18)  SpO2: 96% (08-04-22 @ 10:20) (95% - 96%)  Wt(kg): --  I&O's Summary    03 Aug 2022 07:01  -  04 Aug 2022 07:00  --------------------------------------------------------  IN: 0 mL / OUT: 2000 mL / NET: -2000 mL        Appearance: Normal	  HEENT:   Normal oral mucosa, EOMI	  Cardiovascular:  S1 S2, No JVD,    Respiratory: Lungs clear to auscultation	  Psychiatry: Alert  Gastrointestinal:  Soft, Non-tender, + BS	  Skin: No rashes   Neurologic: Non-focal  Extremities:  No edema  Vascular: Peripheral pulses palpable    	    	  	  CARDIAC MARKERS:  Labs personally reviewed by me                                  12.0   6.06  )-----------( 130      ( 04 Aug 2022 06:36 )             35.9     08-04    144  |  108  |  37<H>  ----------------------------<  99  4.4   |  25  |  1.43<H>    Ca    9.4      04 Aug 2022 06:36  Mg     2.1     08-03    TPro  6.5  /  Alb  4.0  /  TBili  0.4  /  DBili  x   /  AST  15  /  ALT  13  /  AlkPhos  51  08-04          EKG: Personally reviewed by me - SB with RBBB, LAFB  Radiology: Personally reviewed by me -     CT Abdomen and Pelvis w/o Cont (11.05.13 @ 10:34) >  Heart size is mildly enlarged with coronary artery calcifications. Images   through the lung bases demonstrate several tiny calcified nodules, likely   granulomas, as well as atelectasis.        Assessment /Plan:     83 yo m w pmh htn, hld, cad s/p cabg 7 yrs ago in [facility], ckd (baseline cr 1.6-1.8), prostate ca s/p radiation who p/w "penile pain" that began all of a sudden this morning. In ER, found to have urinary retention with bladder distended from ~500 cc urine; casas inserted with immediate resolution of pain.  patient was being prepped for discharge from ER with casas, to follow up outpatient urology for further mgmt, however, HR noted to be as low as 30/min. 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 Followed by Dr. Gagan Dixon as outpatient as PCP and primary Cardiologist.    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 Justin, that is his baseline  - Denies dizziness, lightheadedness, syncope/pre-syncope  - Denies recent ischemic eval; Hx of CAD s/p CABG 7 yrs ago yet denies CP or SOB and exhibits excellent functional capacity  - HR now improved to SB 50-60s  - Bradycardia possibly medication induced vs. secondary to urinary retention  - Will hold amlodipine and clonidine and monitor on tele overnight  - TTE pending    Problem/Plan -2  Problem: HTN  - holding clonidine and amlodipine  - c/w valsartan and chlorthalidone  - Can give Hydral IVP PRN for rebound HTN    Problem/Plan -3  Problem: HLD  - c/w rosuvastatin 20mg PO daily      Differential diagnosis and plan of care discussed with patient after the evaluation. Counseling on diet, nutritional counseling, weight management, exercise and medication compliance was done.   Advanced care planning/advanced directives discussed with patient/family. DNR status including forceful chest compressions to attempt to restart the heart, ventilator support/artificial breathing, electric shock, artificial nutrition, health care proxy, Molst form all discussed with pt. Pt wishes to consider. More than fifteen minutes spent on discussing advanced directives.     Brianna Malone   Blayne Mejias DO [facility]  Cardiovascular Medicine  [address] [place] [address], [address]  Office [phone]  Cell [phone] DATE OF SERVICE: 08-04-22 @ 15:03    CHIEF COMPLAINT:Patient is a 82y old  Male who presents with a chief complaint of penile pain, unable to pass urine (04 Aug 2022 01:17)      HISTORY OF PRESENT ILLNESS:  83 yo m w pmh htn, hld, cad s/p cabg, ckd (baseline cr 1.6-1.8), prostate ca p/w "penile pain" that began all of a sudden this morning. sharp, 10/10 pain in intensity. patient has never had such a pain before, denies it radiating to suprapubic or flank or lower back regions. states pain was associated with no urine output. as pain persisted, patient grew concerned, and made his way to ER for further mgmt.   in ER, found to have urinary retention with bladder distended from ~500 cc urine; casas inserted with immediate resolution of pain.  patient was being prepped for discharge from ER with casas, to follow up outpatient urology for further mgmt, however, HR noted to be as low as 30/min. 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 ER did not feel comfortable discharging patient, so plan made to admit to medicine for further evaluation (04 Aug 2022 01:17)      PAST MEDICAL & SURGICAL HISTORY:  Hypertension      Hyperlipemia      S/P CABG (coronary artery bypass graft)              MEDICATIONS:  amLODIPine   Tablet 5 milliGRAM(s) Oral daily  aspirin  chewable 81 milliGRAM(s) Oral daily  chlorthalidone 25 milliGRAM(s) Oral daily  doxazosin 4 milliGRAM(s) Oral at bedtime  enoxaparin Injectable 40 milliGRAM(s) SubCutaneous every 24 hours  valsartan 160 milliGRAM(s) Oral daily        acetaminophen     Tablet .. 650 milliGRAM(s) Oral every 6 hours PRN  ARIPiprazole 5 milliGRAM(s) Oral daily  melatonin 3 milliGRAM(s) Oral at bedtime PRN  ondansetron Injectable 4 milliGRAM(s) IV Push every 8 hours PRN    aluminum hydroxide/magnesium hydroxide/simethicone Suspension 30 milliLiter(s) Oral every 4 hours PRN  atropine Injectable 0.5 milliGRAM(s) IV Push once PRN    atorvastatin 80 milliGRAM(s) Oral at bedtime        FAMILY HISTORY:      Non-contributory    SOCIAL HISTORY:    [ ] Tobacco- not a former or current smoker  [ ] Drugs  [ ] Alcohol    Allergies    No Known Allergies    Intolerances    	    REVIEW OF SYSTEMS:  CONSTITUTIONAL: No fever  EYES: No eye pain, visual disturbances, or discharge  ENMT:  No difficulty hearing, tinnitus  NECK: No pain or stiffness  RESPIRATORY: No cough, wheezing,  CARDIOVASCULAR: No chest pain, palpitations, passing out, dizziness, or leg swelling  GASTROINTESTINAL:  No nausea, vomiting, diarrhea or constipation. No melena.  GENITOURINARY: retention and penile pain  NEUROLOGICAL: No stroke like symptoms  SKIN: No burning or lesions   ENDOCRINE: No heat or cold intolerance  MUSCULOSKELETAL: No joint pain or swelling  PSYCHIATRIC: No  anxiety, mood swings  HEME/LYMPH: No bleeding gums  ALLERGY AND IMMUNOLOGIC: No hives or eczema	    All other ROS negative    PHYSICAL EXAM:  T(C): 36.4 (08-04-22 @ 10:20), Max: 36.9 (08-03-22 @ 17:48)  HR: 68 (08-04-22 @ 13:25) (43 - 68)  BP: 133/61 (08-04-22 @ 10:20) (133/61 - 178/72)  RR: 16 (08-04-22 @ 10:20) (16 - 18)  SpO2: 96% (08-04-22 @ 10:20) (95% - 96%)  Wt(kg): --  I&O's Summary    03 Aug 2022 07:01  -  04 Aug 2022 07:00  --------------------------------------------------------  IN: 0 mL / OUT: 2000 mL / NET: -2000 mL        Appearance: Normal	  HEENT:   Normal oral mucosa, EOMI	  Cardiovascular:  S1 S2, No JVD,    Respiratory: Lungs clear to auscultation	  Psychiatry: Alert  Gastrointestinal:  Soft, Non-tender, + BS	  Skin: No rashes   Neurologic: Non-focal  Extremities:  No edema  Vascular: Peripheral pulses palpable    	    	  	  CARDIAC MARKERS:  Labs personally reviewed by me                                  12.0   6.06  )-----------( 130      ( 04 Aug 2022 06:36 )             35.9     08-04    144  |  108  |  37<H>  ----------------------------<  99  4.4   |  25  |  1.43<H>    Ca    9.4      04 Aug 2022 06:36  Mg     2.1     08-03    TPro  6.5  /  Alb  4.0  /  TBili  0.4  /  DBili  x   /  AST  15  /  ALT  13  /  AlkPhos  51  08-04          EKG: Personally reviewed by me - SB with RBBB, LAFB  Radiology: Personally reviewed by me -     CT Abdomen and Pelvis w/o Cont (11.05.13 @ 10:34) >  Heart size is mildly enlarged with coronary artery calcifications. Images   through the lung bases demonstrate several tiny calcified nodules, likely   granulomas, as well as atelectasis.        Assessment /Plan:     83 yo m w pmh htn, hld, cad s/p cabg 7 yrs ago in [facility], ckd (baseline cr 1.6-1.8), prostate ca s/p radiation who p/w "penile pain" that began all of a sudden this morning. In ER, found to have urinary retention with bladder distended from ~500 cc urine; casas inserted with immediate resolution of pain.  patient was being prepped for discharge from ER with casas, to follow up outpatient urology for further mgmt, however, HR noted to be as low as 30/min. 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 Followed by Dr. Gagan Dixon as outpatient as PCP and primary Cardiologist.    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 Justin, that is his baseline  - Denies dizziness, lightheadedness, syncope/pre-syncope  - Denies recent ischemic eval; Hx of CAD s/p CABG 7 yrs ago yet denies CP or SOB and exhibits excellent functional capacity  - HR now improved to SB 50-60s  - Bradycardia possibly medication induced vs. secondary to urinary retention  - Will hold Clonidine and monitor on tele overnight  - may resume Amlodipine  - TTE pending    Problem/Plan -2  Problem: HTN  - holding clonidine and resume amlodipine  - c/w valsartan and chlorthalidone  - Can give Hydral IVP PRN for rebound HTN    Problem/Plan -3  Problem: HLD  - c/w rosuvastatin 20mg PO daily      Differential diagnosis and plan of care discussed with patient after the evaluation. Counseling on diet, nutritional counseling, weight management, exercise and medication compliance was done.   Advanced care planning/advanced directives discussed with patient/family. DNR status including forceful chest compressions to attempt to restart the heart, ventilator support/artificial breathing, electric shock, artificial nutrition, health care proxy, Molst form all discussed with pt. Pt wishes to consider. More than fifteen minutes spent on discussing advanced directives.     Brianna Malone [facility]  Blayne Mejias DO [facility]  Cardiovascular Medicine  [address] [place] [address], [address]  Office [phone]  Cell [phone]

## 2022-08-04 NOTE — H&P ADULT - PROBLEM SELECTOR PLAN 5
goal bp <150/90  continue home meds; clonidine 0.2 tid, amlodipine 5, valsartan 160, chlorthalidone 25, doxazosin 4

## 2022-08-04 NOTE — PROVIDER CONTACT NOTE (OTHER) - BACKGROUND
pt. dx. bradycardia. PMH htn, hld, cad s/p cabg, ckd (baseline cr 1.6-1.8), prostate ca p/w penile pain
83y/o M PMH HTN, HLD, CAD s/p CABG, CKD, prostate CA p/w penile pain, found to have obstructive uropathy, relieved with casas insertion. Found to have bradyarrhythmia HR low as 30BPM - asymptomatic

## 2022-08-04 NOTE — H&P ADULT - HISTORY OF PRESENT ILLNESS
83 yo m w pmh htn, hld, cad s/p cabg, ckd (baseline cr 1.6-1.8), prostate ca p/w "penile pain" that began all of a sudden this morning. sharp, 10/10 pain in intensity. patient has never had such a pain before, denies it radiating to suprapubic or flank or lower back regions. states pain was associated with no urine output. as pain persisted, patient grew concerned, and made his way to ER for further mgmt.   in ER, found to have urinary retention with bladder distended from ~500 cc urine; casas inserted with immediate resolution of pain.  patient was being prepped for discharge from ER with casas, to follow up outpatient urology for further mgmt, however, HR noted to be as low as 30/min. 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 ER did not feel comfortable discharging patient, so plan made to admit to medicine for further evaluation

## 2022-08-04 NOTE — PROVIDER CONTACT NOTE (OTHER) - SITUATION
As per tele tech, pt HR as low as 33 BPM, HR sustaining at 35BPM for no greater than 30 seconds. HR range of 33-44BPM.
pt. HR 40s on tele

## 2022-08-04 NOTE — H&P ADULT - PROBLEM SELECTOR PLAN 1
s/p casas insertion with resolution of pain  pocus ed kidney with no features of hydronephrosis; does show hypoechoic focus near the inferior pole of the right kidney.  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

## 2022-08-05 LAB
MRSA PCR RESULT.: SIGNIFICANT CHANGE UP
S AUREUS DNA NOSE QL NAA+PROBE: SIGNIFICANT CHANGE UP

## 2022-08-05 PROCEDURE — 99233 SBSQ HOSP IP/OBS HIGH 50: CPT

## 2022-08-05 PROCEDURE — 12345: CPT | Mod: NC

## 2022-08-05 RX ORDER — SACUBITRIL AND VALSARTAN 24; 26 MG/1; MG/1
1 TABLET, FILM COATED ORAL
Refills: 0 | Status: DISCONTINUED | OUTPATIENT
Start: 2022-08-05 | End: 2022-08-08

## 2022-08-05 RX ORDER — CLONAZEPAM 1 MG
1 TABLET ORAL AT BEDTIME
Refills: 0 | Status: DISCONTINUED | OUTPATIENT
Start: 2022-08-05 | End: 2022-08-10

## 2022-08-05 RX ORDER — SPIRONOLACTONE 25 MG/1
25 TABLET, FILM COATED ORAL DAILY
Refills: 0 | Status: DISCONTINUED | OUTPATIENT
Start: 2022-08-06 | End: 2022-08-08

## 2022-08-05 RX ORDER — CHLORHEXIDINE GLUCONATE 213 G/1000ML
1 SOLUTION TOPICAL
Refills: 0 | Status: DISCONTINUED | OUTPATIENT
Start: 2022-08-05 | End: 2022-08-10

## 2022-08-05 RX ORDER — AMLODIPINE BESYLATE 2.5 MG/1
5 TABLET ORAL DAILY
Refills: 0 | Status: DISCONTINUED | OUTPATIENT
Start: 2022-08-05 | End: 2022-08-05

## 2022-08-05 RX ADMIN — AMLODIPINE BESYLATE 5 MILLIGRAM(S): 2.5 TABLET ORAL at 13:06

## 2022-08-05 RX ADMIN — SACUBITRIL AND VALSARTAN 1 TABLET(S): 24; 26 TABLET, FILM COATED ORAL at 17:09

## 2022-08-05 RX ADMIN — CHLORHEXIDINE GLUCONATE 1 APPLICATION(S): 213 SOLUTION TOPICAL at 13:06

## 2022-08-05 RX ADMIN — VALSARTAN 160 MILLIGRAM(S): 80 TABLET ORAL at 05:55

## 2022-08-05 RX ADMIN — Medication 81 MILLIGRAM(S): at 11:22

## 2022-08-05 RX ADMIN — ENOXAPARIN SODIUM 40 MILLIGRAM(S): 100 INJECTION SUBCUTANEOUS at 13:07

## 2022-08-05 RX ADMIN — Medication 1 MILLIGRAM(S): at 21:44

## 2022-08-05 RX ADMIN — Medication 25 MILLIGRAM(S): at 05:58

## 2022-08-05 RX ADMIN — Medication 4 MILLIGRAM(S): at 21:44

## 2022-08-05 RX ADMIN — ATORVASTATIN CALCIUM 80 MILLIGRAM(S): 80 TABLET, FILM COATED ORAL at 21:43

## 2022-08-05 NOTE — PROGRESS NOTE ADULT - SUBJECTIVE AND OBJECTIVE BOX
[facility] Division of Hospital Medicine  Deisy Kamara MD  Pager (M-F, 8A-5P): [phone], MS Teams PREFERRED  Other Times:  [phone]      SUBJECTIVE / OVERNIGHT EVENTS: Seen and examined at bedside. He appears well. NAD.    MEDICATIONS  (STANDING):  amLODIPine   Tablet 5 milliGRAM(s) Oral daily  ARIPiprazole 5 milliGRAM(s) Oral daily  aspirin  chewable 81 milliGRAM(s) Oral daily  atorvastatin 80 milliGRAM(s) Oral at bedtime  chlorhexidine 2% Cloths 1 Application(s) Topical <User Schedule>  chlorthalidone 25 milliGRAM(s) Oral daily  doxazosin 4 milliGRAM(s) Oral at bedtime  enoxaparin Injectable 40 milliGRAM(s) SubCutaneous every 24 hours  polyethylene glycol 3350 17 Gram(s) Oral daily  valsartan 160 milliGRAM(s) Oral daily    MEDICATIONS  (PRN):  acetaminophen     Tablet .. 650 milliGRAM(s) Oral every 6 hours PRN Temp greater or equal to 38C (100.4F), Mild Pain (1 - 3)  aluminum hydroxide/magnesium hydroxide/simethicone Suspension 30 milliLiter(s) Oral every 4 hours PRN Dyspepsia  atropine Injectable 0.5 milliGRAM(s) IV Push once PRN symptomatic with HR<30, hemodynamically unstable with HR<30  melatonin 3 milliGRAM(s) Oral at bedtime PRN Insomnia  ondansetron Injectable 4 milliGRAM(s) IV Push every 8 hours PRN Nausea and/or Vomiting      I&O's Summary    04 Aug 2022 07:01  -  05 Aug 2022 07:00  --------------------------------------------------------  IN: 0 mL / OUT: 1450 mL / NET: -1450 mL        PHYSICAL EXAM:  Vital Signs Last 24 Hrs  T(C): 36.7 (05 Aug 2022 11:38), Max: 36.7 (04 Aug 2022 20:47)  T(F): 98.1 (05 Aug 2022 11:38), Max: 98.1 (05 Aug 2022 11:38)  HR: 80 (05 Aug 2022 13:07) (49 - 80)  BP: 169/79 (05 Aug 2022 13:07) (147/72 - 183/78)  BP(mean): --  RR: 18 (05 Aug 2022 13:07) (16 - 18)  SpO2: 96% (05 Aug 2022 13:07) (95% - 96%)    Parameters below as of 05 Aug 2022 13:07  Patient On (Oxygen Delivery Method): room air      CONSTITUTIONAL: NAD, well-developed, well-groomed  EYES: PERRLA; conjunctiva and sclera clear  ENMT: Moist oral mucosa, no pharyngeal injection or exudates;   NECK: Supple, no palpable masses;  RESPIRATORY: Normal respiratory effort; lungs are clear to auscultation bilaterally  CARDIOVASCULAR: Regular rate and rhythm, normal S1 and S2, no murmur/rub/gallop; No lower extremity edema;   ABDOMEN: Nontender to palpation, normoactive bowel sounds, no rebound/guarding;   MUSCULOSKELETAL:  Normal gait; no clubbing or cyanosis of digits; no joint swelling or tenderness to palpation  PSYCH: A+O to person, place, and time; affect appropriate  NEUROLOGY: CN 2-12 are intact and symmetric; no gross sensory deficits   SKIN: No rashes; no palpable lesions  LABS:                        12.0   6.06  )-----------( 130      ( 04 Aug 2022 06:36 )             35.9     08-04    144  |  108  |  37<H>  ----------------------------<  99  4.4   |  25  |  1.43<H>    Ca    9.4      04 Aug 2022 06:36  Mg     2.1     08-03    TPro  6.5  /  Alb  4.0  /  TBili  0.4  /  DBili  x   /  AST  15  /  ALT  13  /  AlkPhos  51  08-04          Urinalysis Basic - ( 03 Aug 2022 21:43 )    Color: Light Yellow / Appearance: Clear / S.014 / pH: x  Gluc: x / Ketone: Negative  / Bili: Negative / Urobili: Negative   Blood: x / Protein: Negative / Nitrite: Negative   Leuk Esterase: Negative / RBC: 7 /hpf / WBC 1 /HPF   Sq Epi: x / Non Sq Epi: 0 /hpf / Bacteria: Negative        Culture - Urine (collected 03 Aug 2022 21:42)  Source: Clean Catch Clean Catch (Midstream)  Final Report (04 Aug 2022 23:39):    No growth

## 2022-08-05 NOTE — PROGRESS NOTE ADULT - NS ATTEND OPT1A GEN_ALL_CORE
History/Exam/Medical decision making
In 1-3 treats, patient will ambulate 10 feet with RW and min assist of 2

## 2022-08-05 NOTE — PROGRESS NOTE ADULT - SUBJECTIVE AND OBJECTIVE BOX
Date of Service   08-05-22 @ 13:55    Patient is a 82y old  Male who presents with a chief complaint of penile pain, unable to pass urine (05 Aug 2022 13:12)      INTERVAL HISTORY: pt feels ok   TELEMETRY Personally reviewed: SB/SR 50-90's    REVIEW OF SYSTEMS:   CONSTITUTIONAL: No weakness  EYES/ENT: No visual changes; No throat pain  Neck: No pain or stiffness  Respiratory: No cough, wheezing, No shortness of breath  CARDIOVASCULAR: no chest pain or palpitations  GASTROINTESTINAL: No abdominal pain, no nausea, vomiting or hematemesis  GENITOURINARY: No dysuria, frequency or hematuria  NEUROLOGICAL: No stroke like symptoms  SKIN: No rashes    	  MEDICATIONS:  amLODIPine   Tablet 5 milliGRAM(s) Oral daily  chlorthalidone 25 milliGRAM(s) Oral daily  doxazosin 4 milliGRAM(s) Oral at bedtime  valsartan 160 milliGRAM(s) Oral daily        PHYSICAL EXAM:  T(C): 36.7 (08-05-22 @ 11:38), Max: 36.7 (08-04-22 @ 20:47)  HR: 80 (08-05-22 @ 13:07) (49 - 80)  BP: 169/79 (08-05-22 @ 13:07) (147/72 - 183/78)  RR: 18 (08-05-22 @ 13:07) (16 - 18)  SpO2: 96% (08-05-22 @ 13:07) (95% - 96%)  Wt(kg): --  I&O's Summary    04 Aug 2022 07:01  -  05 Aug 2022 07:00  --------------------------------------------------------  IN: 0 mL / OUT: 1450 mL / NET: -1450 mL          Appearance: In no distress	  HEENT:    PERRL, EOMI	  Cardiovascular:  S1 S2, No JVD  Respiratory: Lungs clear to auscultation	  Gastrointestinal:  Soft, Non-tender, + BS	  Vasculature:  No edema of LE  Psychiatric: Appropriate affect   Neuro: no acute focal deficits                               12.0   6.06  )-----------( 130      ( 04 Aug 2022 06:36 )             35.9     08-04    144  |  108  |  37<H>  ----------------------------<  99  4.4   |  25  |  1.43<H>    Ca    9.4      04 Aug 2022 06:36  Mg     2.1     08-03    TPro  6.5  /  Alb  4.0  /  TBili  0.4  /  DBili  x   /  AST  15  /  ALT  13  /  AlkPhos  51  08-04        Labs personally reviewed  < from: TTE with Doppler (w/Cont) (08.04.22 @ 11:54) >  EF (Jacques Rule): 30 %Doppler Peak Velocity (m/sec):  AoV=1.7    < end of copied text >  < from: TTE with Doppler (w/Cont) (08.04.22 @ 11:54) >  Conclusions:  1. Normal mitral valve. Minimal mitral regurgitation.  2. Calcified aortic valve. Mild aortic regurgitation.  3. Severely dilated left atrium.  LA volume index = 57  cc/m2.  4. Normal left ventricular internal dimensions and wall  thicknesses.Endocardial visualization enhanced with  intravenous injection of Ultrasonic Enhancing Agent  (Lumason). Severe segmental left ventricular systolic  dysfunction. LVEF calculated using biplane Jacques's method  was 30%. Hypokinesis of the anterolateral, inferolateral,  and anterior wall. No left ventricular thrombus. Mild  diastolic dysfunction (Stage I).  5. Normal right atrium.Normal right ventricular size and  function.  6. Normal tricuspid valve. Minimal tricuspid regurgitation.  Estimated pulmonary artery systolic pressure equals 24 mm  Hg, assuming right atrial pressure equals 3  mm Hg,  consistent with normal pulmonary pressures.  *** No previous Echo exam.  Called Dr. Blayne Mejias and communicated results.  *** No previous Echo exam.  ------------------------------------------------------------------------  Confirmed on  8/4/2022 - 18:32:33 by Jaelyn Chirinos M.D.  ------------------------------------------------------------------------    < end of copied text >      ASSESSMENT/PLAN: 	  81 yo m w pmh htn, hld, cad s/p cabg 7 yrs ago in [facility], ckd (baseline cr 1.6-1.8), prostate ca s/p radiation who p/w "penile pain" that began all of a sudden this morning. In ER, found to have urinary retention with bladder distended from ~500 cc urine; casas inserted with immediate resolution of pain.  patient was being prepped for discharge from ER with casas, to follow up outpatient urology for further mgmt, however, HR noted to be as low as 30/min. 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 Followed by Dr. Gagan Dixon as outpatient as PCP and primary Cardiologist.    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 Justin, that is his baseline  - Denies dizziness, lightheadedness, syncope/pre-syncope  - Denies recent ischemic eval; Hx of CAD s/p CABG 7 yrs ago yet denies CP or SOB and exhibits excellent functional capacity  - HR now improved to SB 50-60s  - Bradycardia possibly medication induced vs. secondary to urinary retention (outpatient urology follow up for TOV .continue outpatient doxazosin 4)  - Will hold Clonidine and monitor on tele overnight  - TTE shows EF of 30% Hypokinesis of the anterolateral, inferolateral, and anterior wall. No left ventricular thrombus. Mild   diastolic dysfunction      Problem/Plan -2  Problem: HTN  - holding clonidine   - c/w valsartan and chlorthalidone  - Can give Hydral IVP PRN for rebound HTN  - Amlodipine d/c'd 2/2 sHF     Problem/Plan -3  Problem: HLD  - c/w rosuvastatin 20mg PO daily    Problem/Plan -4  Problem: sHF   - TTE shows EF of 30% with hypokinesis of the anterolateral, inferolateral, and anterior wall. No left ventricular thrombus. Mild diastolic dysfunction.   - Amlodipine d/c'd 2/2 sHF   - c/w margret Bhakta [facility]   Blayne Mejias DO [facility]  Cardiovascular Medicine  [address] [place] [address], [address]  Office: [phone]   Last ov-11-26-19  Next ov-not schedule    Date of Service   08-05-22 @ 13:55    Patient is a 82y old  Male who presents with a chief complaint of penile pain, unable to pass urine (05 Aug 2022 13:12)      INTERVAL HISTORY: pt feels ok   TELEMETRY Personally reviewed: SB/SR 50-90's    REVIEW OF SYSTEMS:   CONSTITUTIONAL: No weakness  EYES/ENT: No visual changes; No throat pain  Neck: No pain or stiffness  Respiratory: No cough, wheezing, No shortness of breath  CARDIOVASCULAR: no chest pain or palpitations  GASTROINTESTINAL: No abdominal pain, no nausea, vomiting or hematemesis  GENITOURINARY: No dysuria, frequency or hematuria  NEUROLOGICAL: No stroke like symptoms  SKIN: No rashes    	  MEDICATIONS:  amLODIPine   Tablet 5 milliGRAM(s) Oral daily  chlorthalidone 25 milliGRAM(s) Oral daily  doxazosin 4 milliGRAM(s) Oral at bedtime  valsartan 160 milliGRAM(s) Oral daily        PHYSICAL EXAM:  T(C): 36.7 (08-05-22 @ 11:38), Max: 36.7 (08-04-22 @ 20:47)  HR: 80 (08-05-22 @ 13:07) (49 - 80)  BP: 169/79 (08-05-22 @ 13:07) (147/72 - 183/78)  RR: 18 (08-05-22 @ 13:07) (16 - 18)  SpO2: 96% (08-05-22 @ 13:07) (95% - 96%)  Wt(kg): --  I&O's Summary    04 Aug 2022 07:01  -  05 Aug 2022 07:00  --------------------------------------------------------  IN: 0 mL / OUT: 1450 mL / NET: -1450 mL          Appearance: In no distress	  HEENT:    PERRL, EOMI	  Cardiovascular:  S1 S2, No JVD  Respiratory: Lungs clear to auscultation	  Gastrointestinal:  Soft, Non-tender, + BS	  Vasculature:  No edema of LE  Psychiatric: Appropriate affect   Neuro: no acute focal deficits                               12.0   6.06  )-----------( 130      ( 04 Aug 2022 06:36 )             35.9     08-04    144  |  108  |  37<H>  ----------------------------<  99  4.4   |  25  |  1.43<H>    Ca    9.4      04 Aug 2022 06:36  Mg     2.1     08-03    TPro  6.5  /  Alb  4.0  /  TBili  0.4  /  DBili  x   /  AST  15  /  ALT  13  /  AlkPhos  51  08-04        Labs personally reviewed  < from: TTE with Doppler (w/Cont) (08.04.22 @ 11:54) >  EF (Jacques Rule): 30 %Doppler Peak Velocity (m/sec):  AoV=1.7    < end of copied text >  < from: TTE with Doppler (w/Cont) (08.04.22 @ 11:54) >  Conclusions:  1. Normal mitral valve. Minimal mitral regurgitation.  2. Calcified aortic valve. Mild aortic regurgitation.  3. Severely dilated left atrium.  LA volume index = 57  cc/m2.  4. Normal left ventricular internal dimensions and wall  thicknesses.Endocardial visualization enhanced with  intravenous injection of Ultrasonic Enhancing Agent  (Lumason). Severe segmental left ventricular systolic  dysfunction. LVEF calculated using biplane Jacques's method  was 30%. Hypokinesis of the anterolateral, inferolateral,  and anterior wall. No left ventricular thrombus. Mild  diastolic dysfunction (Stage I).  5. Normal right atrium.Normal right ventricular size and  function.  6. Normal tricuspid valve. Minimal tricuspid regurgitation.  Estimated pulmonary artery systolic pressure equals 24 mm  Hg, assuming right atrial pressure equals 3  mm Hg,  consistent with normal pulmonary pressures.  *** No previous Echo exam.  Called Dr. Blayen Mejias and communicated results.  *** No previous Echo exam.  ------------------------------------------------------------------------  Confirmed on  8/4/2022 - 18:32:33 by Jaelyn Chirinos M.D.  ------------------------------------------------------------------------    < end of copied text >      ASSESSMENT/PLAN: 	  81 yo m w pmh htn, hld, cad s/p cabg 7 yrs ago in [facility], ckd (baseline cr 1.6-1.8), prostate ca s/p radiation who p/w "penile pain" that began all of a sudden this morning. In ER, found to have urinary retention with bladder distended from ~500 cc urine; casas inserted with immediate resolution of pain.  patient was being prepped for discharge from ER with casas, to follow up outpatient urology for further mgmt, however, HR noted to be as low as 30/min. 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 Followed by Dr. Gagan Dixon as outpatient as PCP and primary Cardiologist.    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 Justin, that is his baseline  - Denies dizziness, lightheadedness, syncope/pre-syncope  - Denies recent ischemic eval; Hx of CAD s/p CABG 7 yrs ago yet denies CP or SOB and exhibits excellent functional capacity  - HR now improved to SB 50-60s  - Bradycardia possibly medication induced vs. secondary to urinary retention (outpatient urology follow up for TOV .continue outpatient doxazosin 4)  - Will d/c Clonidine and monitor on tele overnight  - TTE shows EF of 30% Hypokinesis of the anterolateral, inferolateral, and anterior wall. No left ventricular thrombus. Mild   diastolic dysfunction      Problem/Plan -2  Problem: HTN  - holding clonidine   -  GDMT fotr sHF as noted above    Problem/Plan -3  Problem: HLD  - c/w rosuvastatin 20mg PO daily    Problem/Plan -4  Problem: sHF   - TTE shows EF of 30% with hypokinesis of the anterolateral, inferolateral, and anterior wall. No left ventricular thrombus. Mild diastolic dysfunction.   - Entresto 49/51 BID  - Aldactone 25mg  - d/c all other antihypertensives  - cath if pt/family agreeable      Jovita Bhakta [facility]   Blayne Mejias DO [facility]  Cardiovascular Medicine  [address] [place] [address], [address]  Office: [phone]

## 2022-08-06 DIAGNOSIS — I50.21 ACUTE SYSTOLIC (CONGESTIVE) HEART FAILURE: ICD-10-CM

## 2022-08-06 LAB
ANION GAP SERPL CALC-SCNC: 13 MMOL/L — SIGNIFICANT CHANGE UP (ref 5–17)
BUN SERPL-MCNC: 33 MG/DL — HIGH (ref 7–23)
CALCIUM SERPL-MCNC: 9.7 MG/DL — SIGNIFICANT CHANGE UP (ref 8.4–10.5)
CHLORIDE SERPL-SCNC: 106 MMOL/L — SIGNIFICANT CHANGE UP (ref 96–108)
CO2 SERPL-SCNC: 22 MMOL/L — SIGNIFICANT CHANGE UP (ref 22–31)
CREAT SERPL-MCNC: 1.59 MG/DL — HIGH (ref 0.5–1.3)
EGFR: 43 ML/MIN/1.73M2 — LOW
GLUCOSE SERPL-MCNC: 116 MG/DL — HIGH (ref 70–99)
HCT VFR BLD CALC: 38 % — LOW (ref 39–50)
HGB BLD-MCNC: 13.1 G/DL — SIGNIFICANT CHANGE UP (ref 13–17)
MCHC RBC-ENTMCNC: 29.3 PG — SIGNIFICANT CHANGE UP (ref 27–34)
MCHC RBC-ENTMCNC: 34.5 GM/DL — SIGNIFICANT CHANGE UP (ref 32–36)
MCV RBC AUTO: 85 FL — SIGNIFICANT CHANGE UP (ref 80–100)
NRBC # BLD: 0 /100 WBCS — SIGNIFICANT CHANGE UP (ref 0–0)
PLATELET # BLD AUTO: 140 K/UL — LOW (ref 150–400)
POTASSIUM SERPL-MCNC: 3.8 MMOL/L — SIGNIFICANT CHANGE UP (ref 3.5–5.3)
POTASSIUM SERPL-SCNC: 3.8 MMOL/L — SIGNIFICANT CHANGE UP (ref 3.5–5.3)
RBC # BLD: 4.47 M/UL — SIGNIFICANT CHANGE UP (ref 4.2–5.8)
RBC # FLD: 12.5 % — SIGNIFICANT CHANGE UP (ref 10.3–14.5)
SODIUM SERPL-SCNC: 141 MMOL/L — SIGNIFICANT CHANGE UP (ref 135–145)
WBC # BLD: 6.28 K/UL — SIGNIFICANT CHANGE UP (ref 3.8–10.5)
WBC # FLD AUTO: 6.28 K/UL — SIGNIFICANT CHANGE UP (ref 3.8–10.5)

## 2022-08-06 PROCEDURE — 99233 SBSQ HOSP IP/OBS HIGH 50: CPT

## 2022-08-06 RX ORDER — CARVEDILOL PHOSPHATE 80 MG/1
3.12 CAPSULE, EXTENDED RELEASE ORAL EVERY 12 HOURS
Refills: 0 | Status: DISCONTINUED | OUTPATIENT
Start: 2022-08-06 | End: 2022-08-07

## 2022-08-06 RX ADMIN — ENOXAPARIN SODIUM 40 MILLIGRAM(S): 100 INJECTION SUBCUTANEOUS at 14:55

## 2022-08-06 RX ADMIN — Medication 4 MILLIGRAM(S): at 21:05

## 2022-08-06 RX ADMIN — CHLORHEXIDINE GLUCONATE 1 APPLICATION(S): 213 SOLUTION TOPICAL at 06:13

## 2022-08-06 RX ADMIN — SACUBITRIL AND VALSARTAN 1 TABLET(S): 24; 26 TABLET, FILM COATED ORAL at 06:13

## 2022-08-06 RX ADMIN — SPIRONOLACTONE 25 MILLIGRAM(S): 25 TABLET, FILM COATED ORAL at 06:13

## 2022-08-06 RX ADMIN — SACUBITRIL AND VALSARTAN 1 TABLET(S): 24; 26 TABLET, FILM COATED ORAL at 16:54

## 2022-08-06 RX ADMIN — Medication 81 MILLIGRAM(S): at 11:30

## 2022-08-06 RX ADMIN — Medication 1 MILLIGRAM(S): at 21:05

## 2022-08-06 RX ADMIN — ATORVASTATIN CALCIUM 80 MILLIGRAM(S): 80 TABLET, FILM COATED ORAL at 21:05

## 2022-08-06 RX ADMIN — POLYETHYLENE GLYCOL 3350 17 GRAM(S): 17 POWDER, FOR SOLUTION ORAL at 11:30

## 2022-08-06 NOTE — PROGRESS NOTE ADULT - SUBJECTIVE AND OBJECTIVE BOX
[facility] Division of Hospital Medicine  Morgan Brownlee MD  Available via MS Teams      SUBJECTIVE / OVERNIGHT EVENTS:  83 yo m w pmh htn, hld, cad s/p cabg, ckd (baseline cr 1.6-1.8), prostate ca p/w penile pain, found to have obstructive uropathy, relieved with casas insertion but then found to have bradyarrhythmia with HR as low as 30/min while asymptomatic and hemodynamically stable, admitted to medicine for further mgmt.    Overnight no acute events, Patient seen and examined at bedside, doing well, no complaints at this time. No bradycardia noted last night.  He denies any complaints     ADDITIONAL REVIEW OF SYSTEMS:  negative,     MEDICATIONS  (STANDING):  ARIPiprazole 5 milliGRAM(s) Oral daily  aspirin  chewable 81 milliGRAM(s) Oral daily  atorvastatin 80 milliGRAM(s) Oral at bedtime  chlorhexidine 2% Cloths 1 Application(s) Topical <User Schedule>  clonazePAM  Tablet 1 milliGRAM(s) Oral at bedtime  doxazosin 4 milliGRAM(s) Oral at bedtime  enoxaparin Injectable 40 milliGRAM(s) SubCutaneous every 24 hours  polyethylene glycol 3350 17 Gram(s) Oral daily  sacubitril 49 mG/valsartan 51 mG 1 Tablet(s) Oral two times a day  spironolactone 25 milliGRAM(s) Oral daily    MEDICATIONS  (PRN):  acetaminophen     Tablet .. 650 milliGRAM(s) Oral every 6 hours PRN Temp greater or equal to 38C (100.4F), Mild Pain (1 - 3)  aluminum hydroxide/magnesium hydroxide/simethicone Suspension 30 milliLiter(s) Oral every 4 hours PRN Dyspepsia  atropine Injectable 0.5 milliGRAM(s) IV Push once PRN symptomatic with HR<30, hemodynamically unstable with HR<30  melatonin 3 milliGRAM(s) Oral at bedtime PRN Insomnia  ondansetron Injectable 4 milliGRAM(s) IV Push every 8 hours PRN Nausea and/or Vomiting      I&O's Summary    05 Aug 2022 07:01  -  06 Aug 2022 07:00  --------------------------------------------------------  IN: 680 mL / OUT: 2700 mL / NET: -2020 mL    06 Aug 2022 07:01  -  06 Aug 2022 15:47  --------------------------------------------------------  IN: 480 mL / OUT: 800 mL / NET: -320 mL        PHYSICAL EXAM:  Vital Signs Last 24 Hrs  T(C): 36.8 (06 Aug 2022 11:23), Max: 36.9 (05 Aug 2022 20:13)  T(F): 98.3 (06 Aug 2022 11:23), Max: 98.4 (05 Aug 2022 20:13)  HR: 81 (06 Aug 2022 11:23) (68 - 81)  BP: 146/76 (06 Aug 2022 11:23) (122/70 - 155/80)  BP(mean): --  RR: 18 (06 Aug 2022 11:23) (18 - 18)  SpO2: 95% (06 Aug 2022 11:23) (95% - 97%)    Parameters below as of 06 Aug 2022 11:23  Patient On (Oxygen Delivery Method): room air      CONSTITUTIONAL: NAD, well-developed, well-groomed  EYES: PERRLA; conjunctiva and sclera clear  ENMT: Moist oral mucosa, no pharyngeal injection or exudates; normal dentition  NECK: Supple, no palpable masses; no thyromegaly  RESPIRATORY: Normal respiratory effort; lungs are clear to auscultation bilaterally  CARDIOVASCULAR: Regular rate and rhythm, normal S1 and S2, no murmur/rub/gallop; No lower extremity edema; Peripheral pulses are 2+ bilaterally  ABDOMEN: Nontender to palpation, normoactive bowel sounds, no rebound/guarding; No hepatosplenomegaly  MUSCULOSKELETAL:  Normal gait; no clubbing or cyanosis of digits; no joint swelling or tenderness to palpation  PSYCH: A+O to person, place, and time; affect appropriate  NEUROLOGY: CN 2-12 are intact and symmetric; no gross sensory deficits   SKIN: No rashes; no palpable lesions    LABS:                        13.1   6.28  )-----------( 140      ( 06 Aug 2022 06:21 )             38.0     08-06    141  |  106  |  33<H>  ----------------------------<  116<H>  3.8   |  22  |  1.59<H>    Ca    9.7      06 Aug 2022 06:21                Culture - Urine (collected 03 Aug 2022 21:42)  Source: Clean Catch Clean Catch (Midstream)  Final Report (04 Aug 2022 23:39):    No growth      COVID-19 PCR: NotDetec (03 Aug 2022 23:11)      labs, ekg, and imaging personally reviewed   COORDINATION OF CARE:  Consultant Communication and Details of Discussion (where applicable):

## 2022-08-07 LAB
ANION GAP SERPL CALC-SCNC: 15 MMOL/L — SIGNIFICANT CHANGE UP (ref 5–17)
BUN SERPL-MCNC: 36 MG/DL — HIGH (ref 7–23)
CALCIUM SERPL-MCNC: 9.7 MG/DL — SIGNIFICANT CHANGE UP (ref 8.4–10.5)
CHLORIDE SERPL-SCNC: 106 MMOL/L — SIGNIFICANT CHANGE UP (ref 96–108)
CO2 SERPL-SCNC: 20 MMOL/L — LOW (ref 22–31)
CREAT SERPL-MCNC: 1.57 MG/DL — HIGH (ref 0.5–1.3)
EGFR: 44 ML/MIN/1.73M2 — LOW
GLUCOSE SERPL-MCNC: 125 MG/DL — HIGH (ref 70–99)
POTASSIUM SERPL-MCNC: 4 MMOL/L — SIGNIFICANT CHANGE UP (ref 3.5–5.3)
POTASSIUM SERPL-SCNC: 4 MMOL/L — SIGNIFICANT CHANGE UP (ref 3.5–5.3)
SODIUM SERPL-SCNC: 141 MMOL/L — SIGNIFICANT CHANGE UP (ref 135–145)

## 2022-08-07 PROCEDURE — 99233 SBSQ HOSP IP/OBS HIGH 50: CPT

## 2022-08-07 RX ADMIN — CHLORHEXIDINE GLUCONATE 1 APPLICATION(S): 213 SOLUTION TOPICAL at 05:36

## 2022-08-07 RX ADMIN — SACUBITRIL AND VALSARTAN 1 TABLET(S): 24; 26 TABLET, FILM COATED ORAL at 17:00

## 2022-08-07 RX ADMIN — Medication 81 MILLIGRAM(S): at 11:33

## 2022-08-07 RX ADMIN — Medication 4 MILLIGRAM(S): at 22:11

## 2022-08-07 RX ADMIN — SACUBITRIL AND VALSARTAN 1 TABLET(S): 24; 26 TABLET, FILM COATED ORAL at 05:34

## 2022-08-07 RX ADMIN — ENOXAPARIN SODIUM 40 MILLIGRAM(S): 100 INJECTION SUBCUTANEOUS at 12:50

## 2022-08-07 RX ADMIN — Medication 1 MILLIGRAM(S): at 22:10

## 2022-08-07 RX ADMIN — ATORVASTATIN CALCIUM 80 MILLIGRAM(S): 80 TABLET, FILM COATED ORAL at 22:11

## 2022-08-07 RX ADMIN — SPIRONOLACTONE 25 MILLIGRAM(S): 25 TABLET, FILM COATED ORAL at 05:36

## 2022-08-07 RX ADMIN — CARVEDILOL PHOSPHATE 3.12 MILLIGRAM(S): 80 CAPSULE, EXTENDED RELEASE ORAL at 05:34

## 2022-08-07 NOTE — PROGRESS NOTE ADULT - SUBJECTIVE AND OBJECTIVE BOX
[facility] Division of Hospital Medicine  Morgan Brownlee MD  Available via MS Teams      SUBJECTIVE / OVERNIGHT EVENTS:  83 yo m w pmh htn, hld, cad s/p cabg 7 yrs ago in [facility], ckd (baseline cr 1.6-1.8), prostate ca s/p radiation who p/w "penile pain" In ER, found to have urinary retention with bladder distended from ~500 cc urine; casas inserted with immediate resolution of pain, and urine output.     Overnight no acute events. Patient is doing well, and denies any complaints.       ADDITIONAL REVIEW OF SYSTEMS:  negative, as above    MEDICATIONS  (STANDING):  ARIPiprazole 5 milliGRAM(s) Oral daily  aspirin  chewable 81 milliGRAM(s) Oral daily  atorvastatin 80 milliGRAM(s) Oral at bedtime  carvedilol 3.125 milliGRAM(s) Oral every 12 hours  chlorhexidine 2% Cloths 1 Application(s) Topical <User Schedule>  clonazePAM  Tablet 1 milliGRAM(s) Oral at bedtime  doxazosin 4 milliGRAM(s) Oral at bedtime  enoxaparin Injectable 40 milliGRAM(s) SubCutaneous every 24 hours  polyethylene glycol 3350 17 Gram(s) Oral daily  sacubitril 49 mG/valsartan 51 mG 1 Tablet(s) Oral two times a day  spironolactone 25 milliGRAM(s) Oral daily    MEDICATIONS  (PRN):  acetaminophen     Tablet .. 650 milliGRAM(s) Oral every 6 hours PRN Temp greater or equal to 38C (100.4F), Mild Pain (1 - 3)  aluminum hydroxide/magnesium hydroxide/simethicone Suspension 30 milliLiter(s) Oral every 4 hours PRN Dyspepsia  atropine Injectable 0.5 milliGRAM(s) IV Push once PRN symptomatic with HR<30, hemodynamically unstable with HR<30  melatonin 3 milliGRAM(s) Oral at bedtime PRN Insomnia  ondansetron Injectable 4 milliGRAM(s) IV Push every 8 hours PRN Nausea and/or Vomiting      I&O's Summary    06 Aug 2022 07:01  -  07 Aug 2022 07:00  --------------------------------------------------------  IN: 720 mL / OUT: 1550 mL / NET: -830 mL    07 Aug 2022 07:01  -  07 Aug 2022 14:46  --------------------------------------------------------  IN: 480 mL / OUT: 800 mL / NET: -320 mL        PHYSICAL EXAM:  Vital Signs Last 24 Hrs  T(C): 36.7 (07 Aug 2022 11:35), Max: 36.9 (06 Aug 2022 20:29)  T(F): 98.1 (07 Aug 2022 11:35), Max: 98.4 (06 Aug 2022 20:29)  HR: 91 (07 Aug 2022 11:35) (82 - 95)  BP: 145/82 (07 Aug 2022 11:35) (138/90 - 150/82)  BP(mean): --  RR: 18 (07 Aug 2022 11:35) (18 - 18)  SpO2: 94% (07 Aug 2022 11:35) (94% - 96%)    Parameters below as of 07 Aug 2022 11:35  Patient On (Oxygen Delivery Method): room air      CONSTITUTIONAL: NAD, well-developed, well-groomed  EYES: PERRLA; conjunctiva and sclera clear  ENMT: Moist oral mucosa, no pharyngeal injection or exudates; normal dentition  NECK: Supple, no palpable masses; no thyromegaly  RESPIRATORY: Normal respiratory effort; lungs are clear to auscultation bilaterally  CARDIOVASCULAR: Regular rate and rhythm, normal S1 and S2, no murmur/rub/gallop; No lower extremity edema; Peripheral pulses are 2+ bilaterally  ABDOMEN: Nontender to palpation, normoactive bowel sounds, no rebound/guarding; No hepatosplenomegaly  MUSCULOSKELETAL:  Normal gait; no clubbing or cyanosis of digits; no joint swelling or tenderness to palpation  PSYCH: A+O to person, place, and time; affect appropriate  NEUROLOGY: CN 2-12 are intact and symmetric; no gross sensory deficits   SKIN: No rashes; no palpable lesions    LABS:                        13.1   6.28  )-----------( 140      ( 06 Aug 2022 06:21 )             38.0     08-07    141  |  106  |  36<H>  ----------------------------<  125<H>  4.0   |  20<L>  |  1.57<H>    Ca    9.7      07 Aug 2022 06:29                COVID-19 PCR: Phil (03 Aug 2022 23:11)      RADIOLOGY & ADDITIONAL TESTS:  imaging, ekg, and labs, personally reviewed     COORDINATION OF CARE:  Consultant Communication and Details of Discussion (where applicable):

## 2022-08-07 NOTE — PROGRESS NOTE ADULT - ASSESSMENT
83 yo m w pmh htn, hld, cad s/p cabg 7 yrs ago in [facility], ckd (baseline cr 1.6-1.8), prostate ca s/p radiation who p/w "penile pain"  In ER, found to have urinary retention with bladder distended from ~500 cc urine; casas inserted with immediate resolution of pain, and also found to be bradycardic, with new onset heart failure.

## 2022-08-07 NOTE — PROGRESS NOTE ADULT - SUBJECTIVE AND OBJECTIVE BOX
Date of Service   08-07-22 @ 09:40    Patient is a 82y old  Male who presents with a chief complaint of penile pain, unable to pass urine (06 Aug 2022 15:46)      INTERVAL HISTORY: pt feels ok, updated Daughter on the phone     TELEMETRY Personally reviewed: SR 80's     REVIEW OF SYSTEMS:   CONSTITUTIONAL: No weakness  EYES/ENT: No visual changes; No throat pain  Neck: No pain or stiffness  Respiratory: No cough, wheezing, No shortness of breath  CARDIOVASCULAR: no chest pain or palpitations  GASTROINTESTINAL: No abdominal pain, no nausea, vomiting or hematemesis  GENITOURINARY: No dysuria, frequency or hematuria  NEUROLOGICAL: No stroke like symptoms  SKIN: No rashes    	  MEDICATIONS:  carvedilol 3.125 milliGRAM(s) Oral every 12 hours  doxazosin 4 milliGRAM(s) Oral at bedtime  sacubitril 49 mG/valsartan 51 mG 1 Tablet(s) Oral two times a day  spironolactone 25 milliGRAM(s) Oral daily        PHYSICAL EXAM:  T(C): 36.8 (08-07-22 @ 04:57), Max: 36.9 (08-06-22 @ 20:29)  HR: 87 (08-07-22 @ 04:57) (81 - 95)  BP: 142/76 (08-07-22 @ 04:57) (138/90 - 150/82)  RR: 18 (08-07-22 @ 04:57) (18 - 18)  SpO2: 96% (08-07-22 @ 04:57) (95% - 96%)  Wt(kg): --  I&O's Summary    06 Aug 2022 07:01  -  07 Aug 2022 07:00  --------------------------------------------------------  IN: 720 mL / OUT: 1550 mL / NET: -830 mL          Appearance: In no distress	  HEENT:    PERRL, EOMI	  Cardiovascular:  S1 S2, No JVD  Respiratory: Lungs clear to auscultation	  Gastrointestinal:  Soft, Non-tender, + BS	  Vasculature:  No edema of LE  Psychiatric: Appropriate affect   Neuro: no acute focal deficits                               13.1   6.28  )-----------( 140      ( 06 Aug 2022 06:21 )             38.0     08-07    141  |  106  |  36<H>  ----------------------------<  125<H>  4.0   |  20<L>  |  1.57<H>    Ca    9.7      07 Aug 2022 06:29          Labs personally reviewed      ASSESSMENT/PLAN: 	  81 yo m w pmh htn, hld, cad s/p cabg 7 yrs ago in [facility], ckd (baseline cr 1.6-1.8), prostate ca s/p radiation who p/w "penile pain" that began all of a sudden this morning. In ER, found to have urinary retention with bladder distended from ~500 cc urine; casas inserted with immediate resolution of pain.  patient was being prepped for discharge from ER with casas, to follow up outpatient urology for further mgmt, however, HR noted to be as low as 30/min. 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 Followed by Dr. Gagan Dixon as outpatient as PCP and primary Cardiologist.    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 Justin, that is his baseline  - Denies dizziness, lightheadedness, syncope/pre-syncope  - Denies recent ischemic eval; Hx of CAD s/p CABG 7 yrs ago yet denies CP or SOB and exhibits excellent functional capacity  - HR now improved to SB 50-60s  - Bradycardia possibly medication induced vs. secondary to urinary retention (outpatient urology follow up for TOV .continue outpatient doxazosin 4)  - Will d/c Clonidine  - TTE shows EF of 30% Hypokinesis of the anterolateral, inferolateral, and anterior wall. No left ventricular thrombus. Mild   diastolic dysfunction  - will start low dose Coreg    Problem/Plan -2  Problem: HTN  - d/c all home antihypertensives  - GDMT fotr sHF as noted below  - on spironolactone and entresto     Problem/Plan -3  Problem: HLD  - c/w rosuvastatin 20mg PO daily    Problem/Plan -4  Problem: sHF   - TTE shows EF of 30% with hypokinesis of the anterolateral, inferolateral, and anterior wall. No left ventricular thrombus. Mild diastolic dysfunction.   - Entresto 49/51 BID  - Aldactone 25mg  - started on low dose Coreg 3.125mg BID  - d/c all home antihypertensives  - planning for cath tomorrow, daughter is agreeable and creat at baseline           Jovita Bhakta [facility]   Blayne Mejias DO [facility]  Cardiovascular Medicine  [address] [place] [address], [address]  Office: [phone]   Date of Service   08-07-22 @ 09:40    Patient is a 82y old  Male who presents with a chief complaint of penile pain, unable to pass urine (06 Aug 2022 15:46)      INTERVAL HISTORY: pt feels ok, updated Daughter on the phone     TELEMETRY Personally reviewed: SR 80's     REVIEW OF SYSTEMS:   CONSTITUTIONAL: No weakness  EYES/ENT: No visual changes; No throat pain  Neck: No pain or stiffness  Respiratory: No cough, wheezing, No shortness of breath  CARDIOVASCULAR: no chest pain or palpitations  GASTROINTESTINAL: No abdominal pain, no nausea, vomiting or hematemesis  GENITOURINARY: No dysuria, frequency or hematuria  NEUROLOGICAL: No stroke like symptoms  SKIN: No rashes    	  MEDICATIONS:  carvedilol 3.125 milliGRAM(s) Oral every 12 hours  doxazosin 4 milliGRAM(s) Oral at bedtime  sacubitril 49 mG/valsartan 51 mG 1 Tablet(s) Oral two times a day  spironolactone 25 milliGRAM(s) Oral daily        PHYSICAL EXAM:  T(C): 36.8 (08-07-22 @ 04:57), Max: 36.9 (08-06-22 @ 20:29)  HR: 87 (08-07-22 @ 04:57) (81 - 95)  BP: 142/76 (08-07-22 @ 04:57) (138/90 - 150/82)  RR: 18 (08-07-22 @ 04:57) (18 - 18)  SpO2: 96% (08-07-22 @ 04:57) (95% - 96%)  Wt(kg): --  I&O's Summary    06 Aug 2022 07:01  -  07 Aug 2022 07:00  --------------------------------------------------------  IN: 720 mL / OUT: 1550 mL / NET: -830 mL          Appearance: In no distress	  HEENT:    PERRL, EOMI	  Cardiovascular:  S1 S2, No JVD  Respiratory: Lungs clear to auscultation	  Gastrointestinal:  Soft, Non-tender, + BS	  Vasculature:  No edema of LE  Psychiatric: Appropriate affect   Neuro: no acute focal deficits                               13.1   6.28  )-----------( 140      ( 06 Aug 2022 06:21 )             38.0     08-07    141  |  106  |  36<H>  ----------------------------<  125<H>  4.0   |  20<L>  |  1.57<H>    Ca    9.7      07 Aug 2022 06:29          Labs personally reviewed      ASSESSMENT/PLAN: 	  81 yo m w pmh htn, hld, cad s/p cabg 7 yrs ago in [facility], ckd (baseline cr 1.6-1.8), prostate ca s/p radiation who p/w "penile pain" that began all of a sudden this morning. In ER, found to have urinary retention with bladder distended from ~500 cc urine; casas inserted with immediate resolution of pain.  patient was being prepped for discharge from ER with casas, to follow up outpatient urology for further mgmt, however, HR noted to be as low as 30/min. 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 Followed by Dr. Gagan Dixon as outpatient as PCP and primary Cardiologist.    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 Justin, that is his baseline  - Denies dizziness, lightheadedness, syncope/pre-syncope  - Denies recent ischemic eval; Hx of CAD s/p CABG 7 yrs ago yet denies CP or SOB and exhibits excellent functional capacity  - HR now improved to SB 50-60s  - Bradycardia possibly medication induced vs. secondary to urinary retention (outpatient urology follow up for TOV .continue outpatient doxazosin 4)  - Will d/c Clonidine  - TTE shows EF of 30% Hypokinesis of the anterolateral, inferolateral, and anterior wall. No left ventricular thrombus. Mild   diastolic dysfunction  - will start low dose Coreg    Problem/Plan -2  Problem: HTN  - d/c all home antihypertensives  - GDMT fotr sHF as noted below  - on spironolactone and entresto   - increase Entresto to 97/103 BID as BP tolerates    Problem/Plan -3  Problem: HLD  - c/w rosuvastatin 20mg PO daily    Problem/Plan -4  Problem: sHF   - TTE shows EF of 30% with hypokinesis of the anterolateral, inferolateral, and anterior wall. No left ventricular thrombus. Mild diastolic dysfunction.   - Entresto 49/51 BID,  increase Entresto to 97/103 BID as BP tolerates  - Aldactone 25mg  - started on low dose Coreg 3.125mg BID but discontinued 2/2 recurrent сергей  - d/c all home antihypertensives  - planning for cath tomorrow, daughter is agreeable and creat at baseline   - EP consult Dr Mckeon called for BiV-ICD eval given symptomatic сергей, wide QRS and low EF          Jovita Bhakta [facility]   Blayne Mejias DO [facility]  Cardiovascular Medicine  [address] [place] [address], [address]  Office: [phone]

## 2022-08-08 DIAGNOSIS — N17.9 ACUTE KIDNEY FAILURE, UNSPECIFIED: ICD-10-CM

## 2022-08-08 DIAGNOSIS — Z29.9 ENCOUNTER FOR PROPHYLACTIC MEASURES, UNSPECIFIED: ICD-10-CM

## 2022-08-08 LAB
ALBUMIN SERPL ELPH-MCNC: 4.1 G/DL — SIGNIFICANT CHANGE UP (ref 3.3–5)
ALP SERPL-CCNC: 61 U/L — SIGNIFICANT CHANGE UP (ref 40–120)
ALT FLD-CCNC: 15 U/L — SIGNIFICANT CHANGE UP (ref 10–45)
ANION GAP SERPL CALC-SCNC: 15 MMOL/L — SIGNIFICANT CHANGE UP (ref 5–17)
AST SERPL-CCNC: 16 U/L — SIGNIFICANT CHANGE UP (ref 10–40)
BILIRUB SERPL-MCNC: 0.4 MG/DL — SIGNIFICANT CHANGE UP (ref 0.2–1.2)
BUN SERPL-MCNC: 54 MG/DL — HIGH (ref 7–23)
CALCIUM SERPL-MCNC: 9.3 MG/DL — SIGNIFICANT CHANGE UP (ref 8.4–10.5)
CHLORIDE SERPL-SCNC: 107 MMOL/L — SIGNIFICANT CHANGE UP (ref 96–108)
CO2 SERPL-SCNC: 18 MMOL/L — LOW (ref 22–31)
CREAT SERPL-MCNC: 2.18 MG/DL — HIGH (ref 0.5–1.3)
EGFR: 30 ML/MIN/1.73M2 — LOW
GLUCOSE SERPL-MCNC: 141 MG/DL — HIGH (ref 70–99)
HCT VFR BLD CALC: 40.7 % — SIGNIFICANT CHANGE UP (ref 39–50)
HGB BLD-MCNC: 14 G/DL — SIGNIFICANT CHANGE UP (ref 13–17)
MAGNESIUM SERPL-MCNC: 2.1 MG/DL — SIGNIFICANT CHANGE UP (ref 1.6–2.6)
MCHC RBC-ENTMCNC: 29.5 PG — SIGNIFICANT CHANGE UP (ref 27–34)
MCHC RBC-ENTMCNC: 34.4 GM/DL — SIGNIFICANT CHANGE UP (ref 32–36)
MCV RBC AUTO: 85.7 FL — SIGNIFICANT CHANGE UP (ref 80–100)
NRBC # BLD: 0 /100 WBCS — SIGNIFICANT CHANGE UP (ref 0–0)
PHOSPHATE SERPL-MCNC: 4.3 MG/DL — SIGNIFICANT CHANGE UP (ref 2.5–4.5)
PLATELET # BLD AUTO: 161 K/UL — SIGNIFICANT CHANGE UP (ref 150–400)
POTASSIUM SERPL-MCNC: 4.1 MMOL/L — SIGNIFICANT CHANGE UP (ref 3.5–5.3)
POTASSIUM SERPL-SCNC: 4.1 MMOL/L — SIGNIFICANT CHANGE UP (ref 3.5–5.3)
PROT SERPL-MCNC: 6.9 G/DL — SIGNIFICANT CHANGE UP (ref 6–8.3)
RBC # BLD: 4.75 M/UL — SIGNIFICANT CHANGE UP (ref 4.2–5.8)
RBC # FLD: 13 % — SIGNIFICANT CHANGE UP (ref 10.3–14.5)
SODIUM SERPL-SCNC: 140 MMOL/L — SIGNIFICANT CHANGE UP (ref 135–145)
WBC # BLD: 8.19 K/UL — SIGNIFICANT CHANGE UP (ref 3.8–10.5)
WBC # FLD AUTO: 8.19 K/UL — SIGNIFICANT CHANGE UP (ref 3.8–10.5)

## 2022-08-08 PROCEDURE — 99233 SBSQ HOSP IP/OBS HIGH 50: CPT

## 2022-08-08 RX ORDER — ENOXAPARIN SODIUM 100 MG/ML
30 INJECTION SUBCUTANEOUS EVERY 24 HOURS
Refills: 0 | Status: DISCONTINUED | OUTPATIENT
Start: 2022-08-08 | End: 2022-08-10

## 2022-08-08 RX ORDER — HYDRALAZINE HCL 50 MG
25 TABLET ORAL THREE TIMES A DAY
Refills: 0 | Status: DISCONTINUED | OUTPATIENT
Start: 2022-08-08 | End: 2022-08-10

## 2022-08-08 RX ADMIN — SPIRONOLACTONE 25 MILLIGRAM(S): 25 TABLET, FILM COATED ORAL at 06:25

## 2022-08-08 RX ADMIN — Medication 25 MILLIGRAM(S): at 21:32

## 2022-08-08 RX ADMIN — SACUBITRIL AND VALSARTAN 1 TABLET(S): 24; 26 TABLET, FILM COATED ORAL at 06:25

## 2022-08-08 RX ADMIN — Medication 25 MILLIGRAM(S): at 11:44

## 2022-08-08 RX ADMIN — ENOXAPARIN SODIUM 30 MILLIGRAM(S): 100 INJECTION SUBCUTANEOUS at 11:44

## 2022-08-08 RX ADMIN — Medication 4 MILLIGRAM(S): at 21:33

## 2022-08-08 RX ADMIN — Medication 81 MILLIGRAM(S): at 11:44

## 2022-08-08 RX ADMIN — ATORVASTATIN CALCIUM 80 MILLIGRAM(S): 80 TABLET, FILM COATED ORAL at 21:33

## 2022-08-08 RX ADMIN — ARIPIPRAZOLE 5 MILLIGRAM(S): 15 TABLET ORAL at 11:44

## 2022-08-08 RX ADMIN — CHLORHEXIDINE GLUCONATE 1 APPLICATION(S): 213 SOLUTION TOPICAL at 06:26

## 2022-08-08 RX ADMIN — Medication 1 MILLIGRAM(S): at 21:34

## 2022-08-08 NOTE — PROGRESS NOTE ADULT - SUBJECTIVE AND OBJECTIVE BOX
Tejinder Nava MD  Interventional Cardiology / Endovascular Specialist  [place] Office : [phone] [place] [address]  Tel:   [place] Office : [address] [facility] [place] [address]  Tel: [phone]      Subjective/Overnight events: Patient lying in bed comfortably. No acute distress.   	  MEDICATIONS:  aspirin  chewable 81 milliGRAM(s) Oral daily  doxazosin 4 milliGRAM(s) Oral at bedtime  enoxaparin Injectable 30 milliGRAM(s) SubCutaneous every 24 hours  hydrALAZINE 25 milliGRAM(s) Oral three times a day        acetaminophen     Tablet .. 650 milliGRAM(s) Oral every 6 hours PRN  ARIPiprazole 5 milliGRAM(s) Oral daily  clonazePAM  Tablet 1 milliGRAM(s) Oral at bedtime  melatonin 3 milliGRAM(s) Oral at bedtime PRN  ondansetron Injectable 4 milliGRAM(s) IV Push every 8 hours PRN    aluminum hydroxide/magnesium hydroxide/simethicone Suspension 30 milliLiter(s) Oral every 4 hours PRN  atropine Injectable 0.5 milliGRAM(s) IV Push once PRN  polyethylene glycol 3350 17 Gram(s) Oral daily    atorvastatin 80 milliGRAM(s) Oral at bedtime    chlorhexidine 2% Cloths 1 Application(s) Topical <User Schedule>      PAST MEDICAL/SURGICAL HISTORY  PAST MEDICAL & SURGICAL HISTORY:  Hypertension      Hyperlipemia      S/P CABG (coronary artery bypass graft)          SOCIAL HISTORY: Substance Use (street drugs): ( x ) never used  (  ) other:    FAMILY HISTORY:        PHYSICAL EXAM:  T(C): 36.9 (08-08-22 @ 11:17), Max: 37 (08-08-22 @ 04:59)  HR: 92 (08-08-22 @ 11:17) (87 - 92)  BP: 135/79 (08-08-22 @ 11:17) (129/76 - 135/79)  RR: 18 (08-08-22 @ 11:17) (18 - 18)  SpO2: 92% (08-08-22 @ 11:17) (92% - 96%)  Wt(kg): --  I&O's Summary    07 Aug 2022 07:01  -  08 Aug 2022 07:00  --------------------------------------------------------  IN: 780 mL / OUT: 1350 mL / NET: -570 mL    08 Aug 2022 07:01  -  08 Aug 2022 14:54  --------------------------------------------------------  IN: 480 mL / OUT: 800 mL / NET: -320 mL        Appearance: In no distress	  HEENT:    PERRL, EOMI	  Cardiovascular:  S1 S2, No JVD  Respiratory: Lungs clear to auscultation	  Gastrointestinal:  Soft, Non-tender, + BS	  Vasculature:  No edema of LE  Psychiatric: Appropriate affect   Neuro: no acute focal deficits                                     14.0   8.19  )-----------( 161      ( 08 Aug 2022 05:46 )             40.7     08-08    140  |  107  |  54<H>  ----------------------------<  141<H>  4.1   |  18<L>  |  2.18<H>    Ca    9.3      08 Aug 2022 05:47  Phos  4.3     08-08  Mg     2.1     08-08    TPro  6.9  /  Alb  4.1  /  TBili  0.4  /  DBili  x   /  AST  16  /  ALT  15  /  AlkPhos  61  08-08    proBNP:   Lipid Profile:   HgA1c:   TSH:     Consultant(s) Notes Reviewed:  [x ] YES  [ ] NO    Care Discussed with Consultants/Other Providers [ x] YES  [ ] NO    Imaging Personally Reviewed independently:  [x] YES  [ ] NO    All labs, radiologic studies, vitals, orders and medications list reviewed. Patient is seen and examined at bedside. Case discussed with medical team.

## 2022-08-08 NOTE — PROGRESS NOTE ADULT - ASSESSMENT
81 yo m w pmh htn, hld, cad s/p cabg 7 yrs ago in [facility], ckd (baseline cr 1.6-1.8), prostate ca s/p radiation who p/w "penile pain" In ER, found to have urinary retention with bladder distended from ~500 cc urine; casas inserted with immediate resolution of pain, and also found to be bradycardic, with new onset HFrEF

## 2022-08-08 NOTE — PROGRESS NOTE ADULT - SUBJECTIVE AND OBJECTIVE BOX
Patient is a 82y old  Male who presents with a chief complaint of penile pain, unable to pass urine (07 Aug 2022 14:46)      SUBJECTIVE / OVERNIGHT EVENTS:  no acute events overnight, vss, afebrile  pt feels okay this morning  told him that cath has to be postponed 2/2 rise in SCr    tele reviewed: sinus 70s, upto 110s, PVCs    ROS:  14 point ROS negative in detail except stated as above    MEDICATIONS  (STANDING):  ARIPiprazole 5 milliGRAM(s) Oral daily  aspirin  chewable 81 milliGRAM(s) Oral daily  atorvastatin 80 milliGRAM(s) Oral at bedtime  chlorhexidine 2% Cloths 1 Application(s) Topical <User Schedule>  clonazePAM  Tablet 1 milliGRAM(s) Oral at bedtime  doxazosin 4 milliGRAM(s) Oral at bedtime  enoxaparin Injectable 30 milliGRAM(s) SubCutaneous every 24 hours  hydrALAZINE 25 milliGRAM(s) Oral three times a day  polyethylene glycol 3350 17 Gram(s) Oral daily    MEDICATIONS  (PRN):  acetaminophen     Tablet .. 650 milliGRAM(s) Oral every 6 hours PRN Temp greater or equal to 38C (100.4F), Mild Pain (1 - 3)  aluminum hydroxide/magnesium hydroxide/simethicone Suspension 30 milliLiter(s) Oral every 4 hours PRN Dyspepsia  atropine Injectable 0.5 milliGRAM(s) IV Push once PRN symptomatic with HR<30, hemodynamically unstable with HR<30  melatonin 3 milliGRAM(s) Oral at bedtime PRN Insomnia  ondansetron Injectable 4 milliGRAM(s) IV Push every 8 hours PRN Nausea and/or Vomiting      CAPILLARY BLOOD GLUCOSE        I&O's Summary    07 Aug 2022 07:01  -  08 Aug 2022 07:00  --------------------------------------------------------  IN: 780 mL / OUT: 1350 mL / NET: -570 mL        PHYSICAL EXAM:  Vital Signs Last 24 Hrs  T(C): 36.9 (08 Aug 2022 11:17), Max: 37 (08 Aug 2022 04:59)  T(F): 98.4 (08 Aug 2022 11:17), Max: 98.6 (08 Aug 2022 04:59)  HR: 92 (08 Aug 2022 11:17) (87 - 92)  BP: 135/79 (08 Aug 2022 11:17) (129/76 - 135/79)  BP(mean): --  RR: 18 (08 Aug 2022 11:17) (18 - 18)  SpO2: 92% (08 Aug 2022 11:17) (92% - 96%)    Parameters below as of 08 Aug 2022 11:17  Patient On (Oxygen Delivery Method): room air      GENERAL: NAD, well-developed  HEAD:  Atraumatic, Normocephalic  EYES: EOMI, PERRLA, conjunctiva and sclera clear  NECK: Supple, No JVD  CHEST/LUNG: Clear to auscultation bilaterally; No wheeze  HEART: Regular rate and rhythm; No murmurs, rubs, or gallops  ABDOMEN: Soft, Nontender, Nondistended; Bowel sounds present  EXTREMITIES:  2+ Peripheral Pulses, No clubbing, cyanosis, or edema  NEUROLOGY: AAOx3; non-focal  SKIN: No rashes or lesions    LABS:  personally reviewed                        14.0   8.19  )-----------( 161      ( 08 Aug 2022 05:46 )             40.7     08-08    140  |  107  |  54<H>  ----------------------------<  141<H>  4.1   |  18<L>  |  2.18<H>    Ca    9.3      08 Aug 2022 05:47  Phos  4.3     08-08  Mg     2.1     08-08    TPro  6.9  /  Alb  4.1  /  TBili  0.4  /  DBili  x   /  AST  16  /  ALT  15  /  AlkPhos  61  08-08              RADIOLOGY & ADDITIONAL TESTS:    Imaging Personally Reviewed:    Consultant(s) Notes Reviewed:  cardiology, EP    Care Discussed with Consultants/Other Providers: Dr. Alvarez (cards), Dr. Ramachandran (EP)

## 2022-08-08 NOTE — CONSULT NOTE ADULT - SUBJECTIVE AND OBJECTIVE BOX
EP Attending  HISTORY OF PRESENT ILLNESS: HPI:  83 yo m w pmh htn, hld, cad s/p cabg, ckd (baseline cr 1.6-1.8), prostate ca p/w "penile pain" that began all of a sudden this morning. sharp, 10/10 pain in intensity. patient has never had such a pain before, denies it radiating to suprapubic or flank or lower back regions. states pain was associated with no urine output. as pain persisted, patient grew concerned, and made his way to ER for further mgmt.   in ER, found to have urinary retention with bladder distended from ~500 cc urine; casas inserted with immediate resolution of pain.  patient was being prepped for discharge from ER with casas, to follow up outpatient urology for further mgmt, however, HR noted to be as low as 30/min. 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 ER did not feel comfortable discharging patient, so plan made to admit to medicine for further evaluation (04 Aug 2022 01:17)    Patient, spouse and daughter are concerned- to their knowlege his ejection fraction has been normal, but at this rate has been abnormal for many months on appropriate CHF GDMT.  There is limited beta blockers being used at this time due to сергей-arrhythmia and no ACE/ARBs due to renal dysfunction.  Referred to EP for LV EF <35%, ischemic cardiomyopathy, and bifascicular block, as well as bradycardia in a patient who needs beta blockers.  He has NYHA II-III level of function at home, but no recent chf hospitalizations.  No palpitations or fainting, no known history of AFib.  A 10 pt ROS is otherwise negative.    PAST MEDICAL & SURGICAL HISTORY:  Hypertension  Hyperlipemia  S/P CABG (coronary artery bypass graft)      MEDICATIONS  (STANDING):  ARIPiprazole 5 milliGRAM(s) Oral daily  aspirin  chewable 81 milliGRAM(s) Oral daily  atorvastatin 80 milliGRAM(s) Oral at bedtime  chlorhexidine 2% Cloths 1 Application(s) Topical <User Schedule>  clonazePAM  Tablet 1 milliGRAM(s) Oral at bedtime  doxazosin 4 milliGRAM(s) Oral at bedtime  enoxaparin Injectable 30 milliGRAM(s) SubCutaneous every 24 hours  hydrALAZINE 25 milliGRAM(s) Oral three times a day  polyethylene glycol 3350 17 Gram(s) Oral daily    Allergies    No Known Allergies    Intolerances    FAMILY HISTORY:    Non-contributary for premature coronary disease or sudden cardiac death    SOCIAL HISTORY:    [x ] Non-smoker  [ ] Smoker  [ ] Alcohol    PHYSICAL EXAM:  T(C): 36.9 (08-08-22 @ 11:17), Max: 37 (08-08-22 @ 04:59)  HR: 92 (08-08-22 @ 11:17) (87 - 92)  BP: 135/79 (08-08-22 @ 11:17) (129/76 - 135/79)  RR: 18 (08-08-22 @ 11:17) (18 - 18)  SpO2: 92% (08-08-22 @ 11:17) (92% - 96%)  Wt(kg): --    General: Well nourished, no acute distress, alert and oriented x 3  Head: normocephalic, no trauma  Neck: no JVD, no bruit, supple, not enlarged  CV: S1S2, no S3, regular rate, rhythm is SINUS, no murmurs.    Lungs: clear BL, no rales or wheezes  Abdomen: bowel sounds +, soft, nontender, nondistended  Extremities: no clubbing, cyanosis or edema  Neuro: Moves all 4 extremities, sensation intact x 4 extremities  Skin: warm and moist, normal turgor  Psych: Mood and affect are appropriate for circumstances  MSK: normal range of motion and strength x4 extremities.      TELEMETRY: NSR, wide QRS 	    ECG: NSR, RBBB+LAFB, +ms. 	  Echo:   < from: TTE with Doppler (w/Cont) (08.04.22 @ 11:54) >  Dimensions:    Normal Values:  LA:     4.9    2.0 - 4.0 cm  Ao:     3.4    2.0 - 3.8 cm  SEPTUM: 1.2    0.6 - 1.2cm  PWT:    1.2    0.6 - 1.1 cm  LVIDd:  5.6    3.0 - 5.6 cm  LVIDs:  3.6    1.8 - 4.0 cm  Derived variables:  LVMI: 140 g/m2  RWT: 0.42  Fractional short: 36 %  EF (Jacques Rule): 30 %Doppler Peak Velocity (m/sec):  AoV=1.7  ------------------------------------------------------------------------  Observations:  Mitral Valve: Normal mitral valve. Minimal mitral  regurgitation.  Aortic Valve/Aorta: Calcified aortic valve. Peak  transaortic valve gradient equals 12 mm Hg. Mild aortic  regurgitation.  Peak left ventricular outflow tract  gradient equals 4 mm Hg, mean gradient is equal to 1 mm Hg,  LVOT velocity time integral equals 24 cm.  Normal aortic root size. (Ao: 3.4 cm at the sinuses of  Valsalva).  Left Atrium: Severely dilated left atrium.  LA volume index  = 57 cc/m2.  Left Ventricle: Endocardial visualization enhanced with  intravenous injection of Ultrasonic Enhancing Agent  (Lumason). Severe segmental left ventricular systolic  dysfunction. LVEF calculated using biplane Jacques's method  was 30%. Hypokinesis of the anterolateral, inferolateral,  and anterior wall. No left ventricular thrombus. Normal  left ventricular internal dimensions and wall thicknesses.  Mild diastolic dysfunction (Stage I).  Right Heart: Normal rightatrium. Normal right ventricular  size and function. Normal tricuspid valve. Minimal  tricuspid regurgitation. Normal pulmonic valve.  Pericardium/Pleura: No pericardial effusion seen.  Hemodynamic: Estimated right ventricular systolic pressure  equals 24 mm Hg, assuming right atrial pressure equals 3 mm  Hg, consistent with normal pulmonary pressures. Color  Doppler demonstrates no evidence of a patent foramen ovale.    < end of copied text >      LABS:	 	                          14.0   8.19  )-----------( 161      ( 08 Aug 2022 05:46 )             40.7     08-08    140  |  107  |  54<H>  ----------------------------<  141<H>  4.1   |  18<L>  |  2.18<H>    Ca    9.3      08 Aug 2022 05:47  Phos  4.3     08-08  Mg     2.1     08-08    TPro  6.9  /  Alb  4.1  /  TBili  0.4  /  DBili  x   /  AST  16  /  ALT  15  /  AlkPhos  61  08-08    ASSESSMENT/PLAN: 	82y Male with HTN, CKD III and prior CABG (unknown if prior MI), here with penile pain / urinary retention.  Has longstanding LV EF ~30%, but allegedly had LVEF closer to 55-60% when he had bypass surgery a few years ago.  No recent ischemic workup, and he is being referred for coronary angiogram when kidney function stabilizes.  If PCI performed (lateral wall hypokinetic on Echo, looking for native circumflex or SVG-->OM ), then LV EF should be reassessed in 3 months.  For now, OK to resume beta blockers: Coreg 3.125mg BID-6.25mg BID, and observe on telemetry.  If no PCI performed, he is eligible for a biventricular ICD implant for sinus node dysfunction / CHF+CAD requiring beta blockers, bifascicular block, and chronic low LV EF%.  Will follow.  Pending results of Coronary Angio.      Floyd Mckeon M.D.  Cardiac Electrophysiology    office [phone]  pager [phone]

## 2022-08-08 NOTE — PHYSICAL THERAPY INITIAL EVALUATION ADULT - PERTINENT HX OF CURRENT PROBLEM, REHAB EVAL
82 y.o. M PMH HTN, HLD, CAD s/p cabg, CKD (baseline cr 1.6-1.8), prostate ca p/w "penile pain" that began all of a sudden this morning. 10/10 pain in intensity. Has never had such a pain before, denies it radiating to suprapubic or flank or lower back regions. States pain a/w no urine output. In ED, found to have urinary retention with bladder distended from ~500 cc urine; casas inserted with immediate resolution of pain. Was being prepped for d/c from ER w casas, to follow up outpatient urology for further mgmt, however, HR noted to be as low as 30/min. Although patient was asymptomatic and hemodynamically stable, EKG does show LAFB + RBBB of unclear acuity/chronicity, with no prior EKGs available to compare to. Admitted for further management

## 2022-08-08 NOTE — PHYSICAL THERAPY INITIAL EVALUATION ADULT - ADDITIONAL COMMENTS
Pt resides in a home w/ spouse, no steps to negotiate. PTA pt was independent w/ mobility & did not use an AD

## 2022-08-08 NOTE — PROGRESS NOTE ADULT - ASSESSMENT
COVERING DR. DAVIS    ASSESSMENT/PLAN: 	  83 yo m w pmh htn, hld, cad s/p cabg 7 yrs ago in [facility], ckd (baseline cr 1.6-1.8), prostate ca s/p radiation who p/w "penile pain" that began all of a sudden this morning. In ER, found to have urinary retention with bladder distended from ~500 cc urine; casas inserted with immediate resolution of pain.  patient was being prepped for discharge from ER with casas, to follow up outpatient urology for further mgmt, however, HR noted to be as low as 30/min. 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 Followed by Dr. Gagan Dixon as outpatient as PCP and primary Cardiologist.    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 Justin, that is his baseline  - Denies dizziness, lightheadedness, syncope/pre-syncope  - Denies recent ischemic eval; Hx of CAD s/p CABG 7 yrs ago yet denies CP or SOB and exhibits excellent functional capacity  - Bradycardia possibly medication induced vs. secondary to urinary retention (outpatient urology follow up for TOV .continue outpatient doxazosin 4)  -Clonidine discontinued  - TTE shows EF of 30% Hypokinesis of the anterolateral, inferolateral, and anterior wall. No left ventricular thrombus. Mild   diastolic dysfunction  -coreg d/arley 2/2 recurrent bradycardia  -currently NSR/ST 90-110s on tele. continue to monitor tele     Problem/Plan -2  Problem: HTN  - d/c all home antihypertensives  - GDMT fotr sHF as noted below  - spironolactone and entresto held 2/2 worsening creat    Problem/Plan -3  Problem: HLD  - c/w rosuvastatin 20mg PO daily    Problem/Plan -4  Problem: sHF   - TTE shows EF of 30% with hypokinesis of the anterolateral, inferolateral, and anterior wall. No left ventricular thrombus. Mild diastolic dysfunction.   - Entresto and aldactone held 2/2 worsening creat  - started on low dose Coreg 3.125mg BID but discontinued 2/2 recurrent сергей  - d/c all home antihypertensives  - planning for cath, daughter is agreeable. cancelled 2/2 worsening creat   -f/u EP consult Dr Mckeon called for BiV-ICD eval given symptomatic сергей, wide QRS and low EF       COVERING DR. DAVIS    ASSESSMENT/PLAN: 	  81 yo m w pmh htn, hld, cad s/p cabg 7 yrs ago in [facility], ckd (baseline cr 1.6-1.8), prostate ca s/p radiation who p/w "penile pain" that began all of a sudden this morning. In ER, found to have urinary retention with bladder distended from ~500 cc urine; casas inserted with immediate resolution of pain.  patient was being prepped for discharge from ER with casas, to follow up outpatient urology for further mgmt, however, HR noted to be as low as 30/min. 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 Followed by Dr. Gagan Dixon as outpatient as PCP and primary Cardiologist.    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 Justin, that is his baseline  - Denies dizziness, lightheadedness, syncope/pre-syncope  - Denies recent ischemic eval; Hx of CAD s/p CABG 7 yrs ago yet denies CP or SOB and exhibits excellent functional capacity  - Bradycardia possibly medication induced vs. secondary to urinary retention (outpatient urology follow up for TOV .continue outpatient doxazosin 4)  -Clonidine discontinued  - TTE shows EF of 30% Hypokinesis of the anterolateral, inferolateral, and anterior wall. No left ventricular thrombus. Mild   diastolic dysfunction  -coreg d/arley 2/2 recurrent bradycardia  -currently NSR/ST 90-110s on tele. continue to monitor tele     Problem/Plan -2  Problem: HTN  - d/c all home antihypertensives  - GDMT fotr sHF as noted below  - spironolactone and entresto held 2/2 worsening creat    Problem/Plan -3  Problem: HLD  - c/w rosuvastatin 20mg PO daily    Problem/Plan -4  Problem: sHF   - TTE shows EF of 30% with hypokinesis of the anterolateral, inferolateral, and anterior wall. No left ventricular thrombus. Mild diastolic dysfunction.   - Entresto and aldactone held 2/2 worsening creat  - started on low dose Coreg 3.125mg BID but discontinued 2/2 recurrent сергей  - d/c all home antihypertensives  - planning for cath, daughter is agreeable. cancelled 2/2 worsening creat   -f/u EP consult Dr Mckeon called for BiV-ICD eval given symptomatic сергей, wide QRS and low EF

## 2022-08-09 LAB
ANION GAP SERPL CALC-SCNC: 16 MMOL/L — SIGNIFICANT CHANGE UP (ref 5–17)
BUN SERPL-MCNC: 58 MG/DL — HIGH (ref 7–23)
CALCIUM SERPL-MCNC: 9 MG/DL — SIGNIFICANT CHANGE UP (ref 8.4–10.5)
CHLORIDE SERPL-SCNC: 104 MMOL/L — SIGNIFICANT CHANGE UP (ref 96–108)
CO2 SERPL-SCNC: 18 MMOL/L — LOW (ref 22–31)
CREAT SERPL-MCNC: 2.06 MG/DL — HIGH (ref 0.5–1.3)
EGFR: 32 ML/MIN/1.73M2 — LOW
GLUCOSE SERPL-MCNC: 126 MG/DL — HIGH (ref 70–99)
HCT VFR BLD CALC: 40.6 % — SIGNIFICANT CHANGE UP (ref 39–50)
HGB BLD-MCNC: 13.6 G/DL — SIGNIFICANT CHANGE UP (ref 13–17)
MCHC RBC-ENTMCNC: 28.9 PG — SIGNIFICANT CHANGE UP (ref 27–34)
MCHC RBC-ENTMCNC: 33.5 GM/DL — SIGNIFICANT CHANGE UP (ref 32–36)
MCV RBC AUTO: 86.4 FL — SIGNIFICANT CHANGE UP (ref 80–100)
NRBC # BLD: 0 /100 WBCS — SIGNIFICANT CHANGE UP (ref 0–0)
PLATELET # BLD AUTO: 146 K/UL — LOW (ref 150–400)
POTASSIUM SERPL-MCNC: 3.9 MMOL/L — SIGNIFICANT CHANGE UP (ref 3.5–5.3)
POTASSIUM SERPL-SCNC: 3.9 MMOL/L — SIGNIFICANT CHANGE UP (ref 3.5–5.3)
RBC # BLD: 4.7 M/UL — SIGNIFICANT CHANGE UP (ref 4.2–5.8)
RBC # FLD: 13 % — SIGNIFICANT CHANGE UP (ref 10.3–14.5)
SODIUM SERPL-SCNC: 138 MMOL/L — SIGNIFICANT CHANGE UP (ref 135–145)
WBC # BLD: 8.11 K/UL — SIGNIFICANT CHANGE UP (ref 3.8–10.5)
WBC # FLD AUTO: 8.11 K/UL — SIGNIFICANT CHANGE UP (ref 3.8–10.5)

## 2022-08-09 PROCEDURE — 99233 SBSQ HOSP IP/OBS HIGH 50: CPT

## 2022-08-09 RX ORDER — CARVEDILOL PHOSPHATE 80 MG/1
3.12 CAPSULE, EXTENDED RELEASE ORAL EVERY 12 HOURS
Refills: 0 | Status: DISCONTINUED | OUTPATIENT
Start: 2022-08-09 | End: 2022-08-10

## 2022-08-09 RX ADMIN — Medication 25 MILLIGRAM(S): at 05:14

## 2022-08-09 RX ADMIN — Medication 1 MILLIGRAM(S): at 21:41

## 2022-08-09 RX ADMIN — CARVEDILOL PHOSPHATE 3.12 MILLIGRAM(S): 80 CAPSULE, EXTENDED RELEASE ORAL at 17:17

## 2022-08-09 RX ADMIN — ENOXAPARIN SODIUM 30 MILLIGRAM(S): 100 INJECTION SUBCUTANEOUS at 11:33

## 2022-08-09 RX ADMIN — ATORVASTATIN CALCIUM 80 MILLIGRAM(S): 80 TABLET, FILM COATED ORAL at 21:41

## 2022-08-09 RX ADMIN — Medication 4 MILLIGRAM(S): at 21:41

## 2022-08-09 RX ADMIN — Medication 25 MILLIGRAM(S): at 21:41

## 2022-08-09 RX ADMIN — Medication 25 MILLIGRAM(S): at 13:30

## 2022-08-09 RX ADMIN — Medication 81 MILLIGRAM(S): at 11:33

## 2022-08-09 RX ADMIN — POLYETHYLENE GLYCOL 3350 17 GRAM(S): 17 POWDER, FOR SOLUTION ORAL at 11:33

## 2022-08-09 RX ADMIN — ARIPIPRAZOLE 5 MILLIGRAM(S): 15 TABLET ORAL at 11:33

## 2022-08-09 NOTE — PROGRESS NOTE ADULT - ASSESSMENT
83 yo m w pmh htn, hld, cad s/p cabg 7 yrs ago in [facility], ckd (baseline cr 1.6-1.8), prostate ca s/p radiation who p/w "penile pain" In ER, found to have urinary retention with bladder distended from ~500 cc urine; casas inserted with immediate resolution of pain, and also found to be bradycardic, with new onset HFrEF

## 2022-08-09 NOTE — PROGRESS NOTE ADULT - SUBJECTIVE AND OBJECTIVE BOX
EP Attending  HISTORY OF PRESENT ILLNESS: HPI:  83 yo m w pmh htn, hld, cad s/p cabg, ckd (baseline cr 1.6-1.8), prostate ca p/w "penile pain" that began all of a sudden this morning. sharp, 10/10 pain in intensity. patient has never had such a pain before, denies it radiating to suprapubic or flank or lower back regions. states pain was associated with no urine output. as pain persisted, patient grew concerned, and made his way to ER for further mgmt.   in ER, found to have urinary retention with bladder distended from ~500 cc urine; casas inserted with immediate resolution of pain.  patient was being prepped for discharge from ER with casas, to follow up outpatient urology for further mgmt, however, HR noted to be as low as 30/min. 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 ER did not feel comfortable discharging patient, so plan made to admit to medicine for further evaluation (04 Aug 2022 01:17)    Patient, spouse and daughter are concerned- to their knowlege his ejection fraction has been normal, but at this rate has been abnormal for many months on appropriate CHF GDMT.  There is limited beta blockers being used at this time due to сергей-arrhythmia and no ACE/ARBs due to renal dysfunction.  Referred to EP for LV EF <35%, ischemic cardiomyopathy, and bifascicular block, as well as bradycardia in a patient who needs beta blockers.  He has NYHA II-III level of function at home, but no recent chf hospitalizations.  No palpitations or fainting, no known history of AFib.  A 10 pt ROS is otherwise negative.    acetaminophen     Tablet .. 650 milliGRAM(s) Oral every 6 hours PRN  aluminum hydroxide/magnesium hydroxide/simethicone Suspension 30 milliLiter(s) Oral every 4 hours PRN  ARIPiprazole 5 milliGRAM(s) Oral daily  aspirin  chewable 81 milliGRAM(s) Oral daily  atorvastatin 80 milliGRAM(s) Oral at bedtime  atropine Injectable 0.5 milliGRAM(s) IV Push once PRN  chlorhexidine 2% Cloths 1 Application(s) Topical <User Schedule>  clonazePAM  Tablet 1 milliGRAM(s) Oral at bedtime  doxazosin 4 milliGRAM(s) Oral at bedtime  enoxaparin Injectable 30 milliGRAM(s) SubCutaneous every 24 hours  hydrALAZINE 25 milliGRAM(s) Oral three times a day  melatonin 3 milliGRAM(s) Oral at bedtime PRN  ondansetron Injectable 4 milliGRAM(s) IV Push every 8 hours PRN  polyethylene glycol 3350 17 Gram(s) Oral daily                            13.6   8.11  )-----------( 146      ( 09 Aug 2022 05:41 )             40.6       08-09    138  |  104  |  58<H>  ----------------------------<  126<H>  3.9   |  18<L>  |  2.06<H>    Ca    9.0      09 Aug 2022 05:41  Phos  4.3     08-08  Mg     2.1     08-08    TPro  6.9  /  Alb  4.1  /  TBili  0.4  /  DBili  x   /  AST  16  /  ALT  15  /  AlkPhos  61  08-08    T(C): 36.5 (08-09-22 @ 03:55), Max: 36.9 (08-08-22 @ 11:17)  HR: 96 (08-09-22 @ 03:55) (77 - 96)  BP: 127/77 (08-09-22 @ 03:55) (124/72 - 135/79)  RR: 17 (08-09-22 @ 03:55) (17 - 18)  SpO2: 95% (08-09-22 @ 03:55) (92% - 95%)  Wt(kg): --    I&O's Summary    08 Aug 2022 07:01  -  09 Aug 2022 07:00  --------------------------------------------------------  IN: 480 mL / OUT: 1500 mL / NET: -1020 mL    General: Well nourished, no acute distress, alert and oriented x 3  Head: normocephalic, no trauma  Neck: no JVD, no bruit, supple, not enlarged  CV: S1S2, no S3, regular rate, rhythm is SINUS, no murmurs.    Lungs: clear BL, no rales or wheezes  Abdomen: bowel sounds +, soft, nontender, nondistended  Extremities: no clubbing, cyanosis or edema  Neuro: Moves all 4 extremities, sensation intact x 4 extremities  Skin: warm and moist, normal turgor  Psych: Mood and affect are appropriate for circumstances  MSK: normal range of motion and strength x4 extremities.      TELEMETRY: NSR, wide QRS 	    ECG: NSR, RBBB+LAFB, +ms. 	  Echo:   < from: TTE with Doppler (w/Cont) (08.04.22 @ 11:54) >  Dimensions:    Normal Values:  LA:     4.9    2.0 - 4.0 cm  Ao:     3.4    2.0 - 3.8 cm  SEPTUM: 1.2    0.6 - 1.2cm  PWT:    1.2    0.6 - 1.1 cm  LVIDd:  5.6    3.0 - 5.6 cm  LVIDs:  3.6    1.8 - 4.0 cm  Derived variables:  LVMI: 140 g/m2  RWT: 0.42  Fractional short: 36 %  EF (Jacques Rule): 30 %Doppler Peak Velocity (m/sec):  AoV=1.7  ------------------------------------------------------------------------  Observations:  Mitral Valve: Normal mitral valve. Minimal mitral  regurgitation.  Aortic Valve/Aorta: Calcified aortic valve. Peak  transaortic valve gradient equals 12 mm Hg. Mild aortic  regurgitation.  Peak left ventricular outflow tract  gradient equals 4 mm Hg, mean gradient is equal to 1 mm Hg,  LVOT velocity time integral equals 24 cm.  Normal aortic root size. (Ao: 3.4 cm at the sinuses of  Valsalva).  Left Atrium: Severely dilated left atrium.  LA volume index  = 57 cc/m2.  Left Ventricle: Endocardial visualization enhanced with  intravenous injection of Ultrasonic Enhancing Agent  (Lumason). Severe segmental left ventricular systolic  dysfunction. LVEF calculated using biplane Jacques's method  was 30%. Hypokinesis of the anterolateral, inferolateral,  and anterior wall. No left ventricular thrombus. Normal  left ventricular internal dimensions and wall thicknesses.  Mild diastolic dysfunction (Stage I).  Right Heart: Normal rightatrium. Normal right ventricular  size and function. Normal tricuspid valve. Minimal  tricuspid regurgitation. Normal pulmonic valve.  Pericardium/Pleura: No pericardial effusion seen.  Hemodynamic: Estimated right ventricular systolic pressure  equals 24 mm Hg, assuming right atrial pressure equals 3 mm  Hg, consistent with normal pulmonary pressures. Color  Doppler demonstrates no evidence of a patent foramen ovale.    < end of copied text >    ASSESSMENT/PLAN: 	82y Male with HTN, CKD III and prior CABG (unknown if prior MI), here with penile pain / urinary retention.  Has longstanding LV EF ~30%, but allegedly had LVEF closer to 55-60% when he had bypass surgery a few years ago.  No recent ischemic workup, and he is being referred for coronary angiogram when kidney function stabilizes.  If PCI performed (lateral wall hypokinetic on Echo, looking for native circumflex or SVG-->OM ), then LV EF should be reassessed in 3 months.  Following off of antihypertensives due to acute kidney injury.  If no PCI performed, he is eligible for a biventricular ICD implant for sinus node dysfunction / CHF+CAD requiring beta blockers, bifascicular block, and chronic low LV EF%.  Will follow.  Pending results of Coronary Angio.      Floyd Mckeon M.D.  Cardiac Electrophysiology    office [phone]  pager [phone]

## 2022-08-09 NOTE — PROGRESS NOTE ADULT - SUBJECTIVE AND OBJECTIVE BOX
Patient is a 82y old  Male who presents with a chief complaint of penile pain, unable to pass urine (09 Aug 2022 10:43)      SUBJECTIVE / OVERNIGHT EVENTS:  no acute events overnight, vss, afebrile  pt feels well this morning  spoke to daughter over the phone with pt - given elevated SCr, will delay LHC at this time, if improves, tomorrow  both agree with the plan    tele reviewed: sinus 90-100s    ROS:  14 point ROS negative in detail except stated as above    MEDICATIONS  (STANDING):  ARIPiprazole 5 milliGRAM(s) Oral daily  aspirin  chewable 81 milliGRAM(s) Oral daily  atorvastatin 80 milliGRAM(s) Oral at bedtime  carvedilol 3.125 milliGRAM(s) Oral every 12 hours  chlorhexidine 2% Cloths 1 Application(s) Topical <User Schedule>  clonazePAM  Tablet 1 milliGRAM(s) Oral at bedtime  doxazosin 4 milliGRAM(s) Oral at bedtime  enoxaparin Injectable 30 milliGRAM(s) SubCutaneous every 24 hours  hydrALAZINE 25 milliGRAM(s) Oral three times a day  polyethylene glycol 3350 17 Gram(s) Oral daily    MEDICATIONS  (PRN):  acetaminophen     Tablet .. 650 milliGRAM(s) Oral every 6 hours PRN Temp greater or equal to 38C (100.4F), Mild Pain (1 - 3)  aluminum hydroxide/magnesium hydroxide/simethicone Suspension 30 milliLiter(s) Oral every 4 hours PRN Dyspepsia  atropine Injectable 0.5 milliGRAM(s) IV Push once PRN symptomatic with HR<30, hemodynamically unstable with HR<30  melatonin 3 milliGRAM(s) Oral at bedtime PRN Insomnia  ondansetron Injectable 4 milliGRAM(s) IV Push every 8 hours PRN Nausea and/or Vomiting      CAPILLARY BLOOD GLUCOSE        I&O's Summary    08 Aug 2022 07:01  -  09 Aug 2022 07:00  --------------------------------------------------------  IN: 480 mL / OUT: 1500 mL / NET: -1020 mL        PHYSICAL EXAM:  Vital Signs Last 24 Hrs  T(C): 36.5 (09 Aug 2022 03:55), Max: 36.9 (08 Aug 2022 19:57)  T(F): 97.7 (09 Aug 2022 03:55), Max: 98.4 (08 Aug 2022 19:57)  HR: 96 (09 Aug 2022 03:55) (77 - 96)  BP: 127/77 (09 Aug 2022 03:55) (124/72 - 127/77)  BP(mean): --  RR: 17 (09 Aug 2022 03:55) (17 - 18)  SpO2: 95% (09 Aug 2022 03:55) (95% - 95%)    Parameters below as of 09 Aug 2022 03:55  Patient On (Oxygen Delivery Method): room air      GENERAL: NAD, well-developed  HEAD:  Atraumatic, Normocephalic  EYES: EOMI, PERRLA, conjunctiva and sclera clear  NECK: Supple, No JVD  CHEST/LUNG: Clear to auscultation bilaterally; No wheeze  HEART: Regular rate and rhythm; No murmurs, rubs, or gallops  ABDOMEN: Soft, Nontender, Nondistended; Bowel sounds present  FU: (+) casas catheter  EXTREMITIES:  2+ Peripheral Pulses, No clubbing, cyanosis, or edema  NEUROLOGY: AAOx3; non-focal  SKIN: No rashes or lesions    LABS:  personally reviewed                        13.6   8.11  )-----------( 146      ( 09 Aug 2022 05:41 )             40.6     08-09    138  |  104  |  58<H>  ----------------------------<  126<H>  3.9   |  18<L>  |  2.06<H>    Ca    9.0      09 Aug 2022 05:41  Phos  4.3     08-08  Mg     2.1     08-08    TPro  6.9  /  Alb  4.1  /  TBili  0.4  /  DBili  x   /  AST  16  /  ALT  15  /  AlkPhos  61  08-08              RADIOLOGY & ADDITIONAL TESTS:    Imaging Personally Reviewed:    Consultant(s) Notes Reviewed:  cardiology, EP    Care Discussed with Consultants/Other Providers: Dr. Alvarez (cards), Dr. Mckeon (EP)

## 2022-08-09 NOTE — PROGRESS NOTE ADULT - NS ATTEND OPT1 GEN_ALL_CORE
I attest my time as attending is greater than 50% of the total combined time spent on qualifying patient care activities by the PA/NP and attending.
I independently performed the documented:
I attest my time as attending is greater than 50% of the total combined time spent on qualifying patient care activities by the PA/NP and attending.

## 2022-08-09 NOTE — PROGRESS NOTE ADULT - NS ATTEND AMEND GEN_ALL_CORE FT
I reviewed the overnight course of events on the unit, re-confirming the patient history. I discussed the care with the patient and their family. The plan of care was discussed with the ACP team and modifications were made to the notation where appropriate. Differential diagnosis and plan of care discussed with patient after the evaluation. Advanced care planning was discussed with patient and family.  Advanced care planning forms were reviewed and discussed.  Risks, benefits and alternatives of cardiac procedures were discussed in detail and all questions were answered. 35 minutes spent on total encounter of which more than fifty percent of the encounter was spent counseling and/or coordinating care by the attending physician.
Pt care and plan discussed and reviewed with NP. Plan as outlined above edited by me to reflect our discussion.
Pt care and plan discussed and reviewed with NP. Plan as outlined above edited by me to reflect our discussion.
I reviewed the overnight course of events on the unit, re-confirming the patient history. I discussed the care with the patient and their family. The plan of care was discussed with the ACP team and modifications were made to the notation where appropriate. Differential diagnosis and plan of care discussed with patient after the evaluation. Advanced care planning was discussed with patient and family.  Advanced care planning forms were reviewed and discussed.  Risks, benefits and alternatives of cardiac procedures were discussed in detail and all questions were answered. 35 minutes spent on total encounter of which more than fifty percent of the encounter was spent counseling and/or coordinating care by the attending physician.
Pt care and plan discussed and reviewed with NP. Plan as outlined above edited by me to reflect our discussion.

## 2022-08-09 NOTE — PROGRESS NOTE ADULT - SUBJECTIVE AND OBJECTIVE BOX
Tejinder Nava MD  Interventional Cardiology / Endovascular Specialist  [place] Office : [phone] [place] [id number]  Tel:   [place] Office : [phone] [facility] [place] [address]  Tel: [phone]      Subjective/Overnight events: Patient lying in bed comfortably. No acute distress. Denies chest pain, SOB or palpitations  	  MEDICATIONS:  aspirin  chewable 81 milliGRAM(s) Oral daily  doxazosin 4 milliGRAM(s) Oral at bedtime  enoxaparin Injectable 30 milliGRAM(s) SubCutaneous every 24 hours  hydrALAZINE 25 milliGRAM(s) Oral three times a day        acetaminophen     Tablet .. 650 milliGRAM(s) Oral every 6 hours PRN  ARIPiprazole 5 milliGRAM(s) Oral daily  clonazePAM  Tablet 1 milliGRAM(s) Oral at bedtime  melatonin 3 milliGRAM(s) Oral at bedtime PRN  ondansetron Injectable 4 milliGRAM(s) IV Push every 8 hours PRN    aluminum hydroxide/magnesium hydroxide/simethicone Suspension 30 milliLiter(s) Oral every 4 hours PRN  atropine Injectable 0.5 milliGRAM(s) IV Push once PRN  polyethylene glycol 3350 17 Gram(s) Oral daily    atorvastatin 80 milliGRAM(s) Oral at bedtime    chlorhexidine 2% Cloths 1 Application(s) Topical <User Schedule>      PAST MEDICAL/SURGICAL HISTORY  PAST MEDICAL & SURGICAL HISTORY:  Hypertension      Hyperlipemia      S/P CABG (coronary artery bypass graft)          SOCIAL HISTORY: Substance Use (street drugs): ( x ) never used  (  ) other:    FAMILY HISTORY:          PHYSICAL EXAM:  T(C): 36.5 (08-09-22 @ 03:55), Max: 36.9 (08-08-22 @ 11:17)  HR: 96 (08-09-22 @ 03:55) (77 - 96)  BP: 127/77 (08-09-22 @ 03:55) (124/72 - 135/79)  RR: 17 (08-09-22 @ 03:55) (17 - 18)  SpO2: 95% (08-09-22 @ 03:55) (92% - 95%)  Wt(kg): --  I&O's Summary    08 Aug 2022 07:01  -  09 Aug 2022 07:00  --------------------------------------------------------  IN: 480 mL / OUT: 1500 mL / NET: -1020 mL        Appearance: In no distress	  HEENT:    PERRL, EOMI	  Cardiovascular:  S1 S2, No JVD  Respiratory: Lungs clear to auscultation	  Gastrointestinal:  Soft, Non-tender, + BS	  Vasculature:  No edema of LE  Psychiatric: Appropriate affect   Neuro: no acute focal deficits                                         13.6   8.11  )-----------( 146      ( 09 Aug 2022 05:41 )             40.6     08-09    138  |  104  |  58<H>  ----------------------------<  126<H>  3.9   |  18<L>  |  2.06<H>    Ca    9.0      09 Aug 2022 05:41  Phos  4.3     08-08  Mg     2.1     08-08    TPro  6.9  /  Alb  4.1  /  TBili  0.4  /  DBili  x   /  AST  16  /  ALT  15  /  AlkPhos  61  08-08    proBNP:   Lipid Profile:   HgA1c:   TSH:     Consultant(s) Notes Reviewed:  [x ] YES  [ ] NO    Care Discussed with Consultants/Other Providers [ x] YES  [ ] NO    Imaging Personally Reviewed independently:  [x] YES  [ ] NO    All labs, radiologic studies, vitals, orders and medications list reviewed. Patient is seen and examined at bedside. Case discussed with medical team.

## 2022-08-09 NOTE — PROGRESS NOTE ADULT - ASSESSMENT
COVERING DR. DAVIS    ASSESSMENT/PLAN: 	  81 yo m w pmh htn, hld, cad s/p cabg 7 yrs ago in [facility], ckd (baseline cr 1.6-1.8), prostate ca s/p radiation who p/w "penile pain" that began all of a sudden this morning. In ER, found to have urinary retention with bladder distended from ~500 cc urine; casas inserted with immediate resolution of pain.  patient was being prepped for discharge from ER with casas, to follow up outpatient urology for further mgmt, however, HR noted to be as low as 30/min. 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 Followed by Dr. Gagan Dixon as outpatient as PCP and primary Cardiologist.    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 Justin, that is his baseline  - Denies dizziness, lightheadedness, syncope/pre-syncope  - Denies recent ischemic eval; Hx of CAD s/p CABG 7 yrs ago yet denies CP or SOB and exhibits excellent functional capacity  - Bradycardia possibly medication induced vs. secondary to urinary retention (outpatient urology follow up for TOV .continue outpatient doxazosin 4)  -Clonidine discontinued  - TTE shows EF of 30% Hypokinesis of the anterolateral, inferolateral, and anterior wall. No left ventricular thrombus. Mild   diastolic dysfunction  -coreg d/arley 2/2 recurrent bradycardia  -currently NSR/ST 90-110s on tele. continue to monitor tele     Problem/Plan -2  Problem: HTN  - d/c all home antihypertensives  - GDMT fotr sHF as noted below  - spironolactone and entresto held 2/2 worsening creat    Problem/Plan -3  Problem: HLD  - c/w rosuvastatin 20mg PO daily    Problem/Plan -4  Problem: sHF   - TTE shows EF of 30% with hypokinesis of the anterolateral, inferolateral, and anterior wall. No left ventricular thrombus. Mild diastolic dysfunction.   - Entresto and aldactone held 2/2 worsening creat  - started on low dose Coreg 3.125mg BID but discontinued 2/2 recurrent сергей  - d/c all home antihypertensives  - planning for cath, daughter is agreeable. cancelled 2/2 worsening creat. improved today, if continue to improve will likely cath tomorrow  -f/u EP consult Dr Mckeon called for BiV-ICD eval given symptomatic сергей, wide QRS and low EF COVERING DR. DAVIS    ASSESSMENT/PLAN: 	  83 yo m w pmh htn, hld, cad s/p cabg 7 yrs ago in [facility], ckd (baseline cr 1.6-1.8), prostate ca s/p radiation who p/w "penile pain" that began all of a sudden this morning. In ER, found to have urinary retention with bladder distended from ~500 cc urine; casas inserted with immediate resolution of pain.  patient was being prepped for discharge from ER with casas, to follow up outpatient urology for further mgmt, however, HR noted to be as low as 30/min. 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 Followed by Dr. Gagan Dixon as outpatient as PCP and primary Cardiologist.    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 Justin, that is his baseline  - Denies dizziness, lightheadedness, syncope/pre-syncope  - Denies recent ischemic eval; Hx of CAD s/p CABG 7 yrs ago yet denies CP or SOB and exhibits excellent functional capacity  - Bradycardia possibly medication induced vs. secondary to urinary retention (outpatient urology follow up for TOV .continue outpatient doxazosin 4)  -Clonidine discontinued  - TTE shows EF of 30% Hypokinesis of the anterolateral, inferolateral, and anterior wall. No left ventricular thrombus. Mild   diastolic dysfunction  -coreg d/arley 2/2 recurrent bradycardia  -currently NSR/ST 90-110s on tele. continue to monitor tele     Problem/Plan -2  Problem: HTN  - d/c all home antihypertensives  - GDMT for HFrEF as noted below  - spironolactone and entresto held 2/2 worsening creat    Problem/Plan -3  Problem: HLD  - c/w rosuvastatin 20mg PO daily    Problem/Plan -4  Problem: sHF   - TTE shows EF of 30% with hypokinesis of the anterolateral, inferolateral, and anterior wall. No left ventricular thrombus. Mild diastolic dysfunction.   - Entresto and aldactone held 2/2 worsening creat  - started on low dose Coreg 3.125mg BID but discontinued 2/2 recurrent сергей  - d/c all home antihypertensives  - planning for cath, daughter is agreeable. cancelled 2/2 worsening creat. improved today, if continue to improve will likely cath tomorrow  -f/u EP consult Dr Mckeon called for BiV-ICD eval given symptomatic сергей, wide QRS and low EF

## 2022-08-10 ENCOUNTER — TRANSCRIPTION ENCOUNTER (OUTPATIENT)
Age: 82
End: 2022-08-10

## 2022-08-10 VITALS — DIASTOLIC BLOOD PRESSURE: 82 MMHG | SYSTOLIC BLOOD PRESSURE: 145 MMHG

## 2022-08-10 LAB
ANION GAP SERPL CALC-SCNC: 14 MMOL/L — SIGNIFICANT CHANGE UP (ref 5–17)
BUN SERPL-MCNC: 71 MG/DL — HIGH (ref 7–23)
CALCIUM SERPL-MCNC: 8.7 MG/DL — SIGNIFICANT CHANGE UP (ref 8.4–10.5)
CHLORIDE SERPL-SCNC: 103 MMOL/L — SIGNIFICANT CHANGE UP (ref 96–108)
CO2 SERPL-SCNC: 19 MMOL/L — LOW (ref 22–31)
CREAT SERPL-MCNC: 2.12 MG/DL — HIGH (ref 0.5–1.3)
EGFR: 31 ML/MIN/1.73M2 — LOW
GLUCOSE SERPL-MCNC: 107 MG/DL — HIGH (ref 70–99)
HCT VFR BLD CALC: 39.1 % — SIGNIFICANT CHANGE UP (ref 39–50)
HGB BLD-MCNC: 13.1 G/DL — SIGNIFICANT CHANGE UP (ref 13–17)
MCHC RBC-ENTMCNC: 29.6 PG — SIGNIFICANT CHANGE UP (ref 27–34)
MCHC RBC-ENTMCNC: 33.5 GM/DL — SIGNIFICANT CHANGE UP (ref 32–36)
MCV RBC AUTO: 88.5 FL — SIGNIFICANT CHANGE UP (ref 80–100)
NRBC # BLD: 0 /100 WBCS — SIGNIFICANT CHANGE UP (ref 0–0)
PLATELET # BLD AUTO: 156 K/UL — SIGNIFICANT CHANGE UP (ref 150–400)
POTASSIUM SERPL-MCNC: 4.3 MMOL/L — SIGNIFICANT CHANGE UP (ref 3.5–5.3)
POTASSIUM SERPL-SCNC: 4.3 MMOL/L — SIGNIFICANT CHANGE UP (ref 3.5–5.3)
RBC # BLD: 4.42 M/UL — SIGNIFICANT CHANGE UP (ref 4.2–5.8)
RBC # FLD: 13.1 % — SIGNIFICANT CHANGE UP (ref 10.3–14.5)
SARS-COV-2 RNA SPEC QL NAA+PROBE: SIGNIFICANT CHANGE UP
SODIUM SERPL-SCNC: 136 MMOL/L — SIGNIFICANT CHANGE UP (ref 135–145)
WBC # BLD: 7.93 K/UL — SIGNIFICANT CHANGE UP (ref 3.8–10.5)
WBC # FLD AUTO: 7.93 K/UL — SIGNIFICANT CHANGE UP (ref 3.8–10.5)

## 2022-08-10 PROCEDURE — 84100 ASSAY OF PHOSPHORUS: CPT

## 2022-08-10 PROCEDURE — 76770 US EXAM ABDO BACK WALL COMP: CPT

## 2022-08-10 PROCEDURE — 82947 ASSAY GLUCOSE BLOOD QUANT: CPT

## 2022-08-10 PROCEDURE — 93306 TTE W/DOPPLER COMPLETE: CPT

## 2022-08-10 PROCEDURE — 87635 SARS-COV-2 COVID-19 AMP PRB: CPT

## 2022-08-10 PROCEDURE — 87640 STAPH A DNA AMP PROBE: CPT

## 2022-08-10 PROCEDURE — 99233 SBSQ HOSP IP/OBS HIGH 50: CPT

## 2022-08-10 PROCEDURE — 51702 INSERT TEMP BLADDER CATH: CPT

## 2022-08-10 PROCEDURE — 83735 ASSAY OF MAGNESIUM: CPT

## 2022-08-10 PROCEDURE — 82570 ASSAY OF URINE CREATININE: CPT

## 2022-08-10 PROCEDURE — 85027 COMPLETE CBC AUTOMATED: CPT

## 2022-08-10 PROCEDURE — 83935 ASSAY OF URINE OSMOLALITY: CPT

## 2022-08-10 PROCEDURE — 84156 ASSAY OF PROTEIN URINE: CPT

## 2022-08-10 PROCEDURE — U0003: CPT

## 2022-08-10 PROCEDURE — 84443 ASSAY THYROID STIM HORMONE: CPT

## 2022-08-10 PROCEDURE — 80048 BASIC METABOLIC PNL TOTAL CA: CPT

## 2022-08-10 PROCEDURE — 85025 COMPLETE CBC W/AUTO DIFF WBC: CPT

## 2022-08-10 PROCEDURE — 84300 ASSAY OF URINE SODIUM: CPT

## 2022-08-10 PROCEDURE — U0005: CPT

## 2022-08-10 PROCEDURE — 99285 EMERGENCY DEPT VISIT HI MDM: CPT | Mod: 25

## 2022-08-10 PROCEDURE — 84484 ASSAY OF TROPONIN QUANT: CPT

## 2022-08-10 PROCEDURE — 85014 HEMATOCRIT: CPT

## 2022-08-10 PROCEDURE — 87641 MR-STAPH DNA AMP PROBE: CPT

## 2022-08-10 PROCEDURE — 84295 ASSAY OF SERUM SODIUM: CPT

## 2022-08-10 PROCEDURE — 84132 ASSAY OF SERUM POTASSIUM: CPT

## 2022-08-10 PROCEDURE — 87086 URINE CULTURE/COLONY COUNT: CPT

## 2022-08-10 PROCEDURE — 80053 COMPREHEN METABOLIC PANEL: CPT

## 2022-08-10 PROCEDURE — 82330 ASSAY OF CALCIUM: CPT

## 2022-08-10 PROCEDURE — 81001 URINALYSIS AUTO W/SCOPE: CPT

## 2022-08-10 PROCEDURE — 36415 COLL VENOUS BLD VENIPUNCTURE: CPT

## 2022-08-10 PROCEDURE — 82803 BLOOD GASES ANY COMBINATION: CPT

## 2022-08-10 PROCEDURE — 85018 HEMOGLOBIN: CPT

## 2022-08-10 PROCEDURE — 82435 ASSAY OF BLOOD CHLORIDE: CPT

## 2022-08-10 PROCEDURE — 84540 ASSAY OF URINE/UREA-N: CPT

## 2022-08-10 PROCEDURE — 83605 ASSAY OF LACTIC ACID: CPT

## 2022-08-10 PROCEDURE — 97161 PT EVAL LOW COMPLEX 20 MIN: CPT

## 2022-08-10 PROCEDURE — 76775 US EXAM ABDO BACK WALL LIM: CPT

## 2022-08-10 RX ORDER — VALSARTAN 80 MG/1
1 TABLET ORAL
Qty: 0 | Refills: 0 | DISCHARGE

## 2022-08-10 RX ORDER — CARVEDILOL PHOSPHATE 80 MG/1
1 CAPSULE, EXTENDED RELEASE ORAL
Qty: 60 | Refills: 0
Start: 2022-08-10 | End: 2022-09-08

## 2022-08-10 RX ORDER — HYDRALAZINE HCL 50 MG
1 TABLET ORAL
Qty: 90 | Refills: 0
Start: 2022-08-10 | End: 2022-09-08

## 2022-08-10 RX ORDER — AMLODIPINE BESYLATE 2.5 MG/1
1 TABLET ORAL
Qty: 0 | Refills: 0 | DISCHARGE

## 2022-08-10 RX ORDER — CHLORTHALIDONE 50 MG
1 TABLET ORAL
Qty: 0 | Refills: 0 | DISCHARGE

## 2022-08-10 RX ADMIN — ONDANSETRON 4 MILLIGRAM(S): 8 TABLET, FILM COATED ORAL at 16:40

## 2022-08-10 RX ADMIN — ARIPIPRAZOLE 5 MILLIGRAM(S): 15 TABLET ORAL at 11:57

## 2022-08-10 RX ADMIN — CARVEDILOL PHOSPHATE 3.12 MILLIGRAM(S): 80 CAPSULE, EXTENDED RELEASE ORAL at 17:27

## 2022-08-10 RX ADMIN — Medication 25 MILLIGRAM(S): at 04:53

## 2022-08-10 RX ADMIN — CARVEDILOL PHOSPHATE 3.12 MILLIGRAM(S): 80 CAPSULE, EXTENDED RELEASE ORAL at 04:54

## 2022-08-10 RX ADMIN — POLYETHYLENE GLYCOL 3350 17 GRAM(S): 17 POWDER, FOR SOLUTION ORAL at 11:57

## 2022-08-10 RX ADMIN — Medication 81 MILLIGRAM(S): at 11:57

## 2022-08-10 NOTE — PROGRESS NOTE ADULT - PROBLEM SELECTOR PLAN 5
baseline Cr 1.6-1.8  creatinine 2.1 today,   - Monitor UO, BUN/Cr, volume status, acid-base balance, electrolytes   - avoid nephrotoxic agents; appropriate dose adjustments for all renally cleared medications   - judicious IVF + electrolyte supplementation as needed to maintain goal.
goal bp <150/90  continue home meds; clonidine 0.2 tid, amlodipine 5, valsartan 160, chlorthalidone 25, doxazosin 4
s/p CABG 7 yrs ago  asymptomatic, trop negative, no ekg findings suggestive of acute ischemia  continue home asa, statin; hold AVN blockers for now given bradyarrhythmia.  cath inpatient
s/p CABG 7 yrs ago  asymptomatic, trop negative, no ekg findings suggestive of acute ischemia  continue home asa, statin; hold AVN blockers for now given bradyarrhythmia.  cath inpatient.
baseline Cr 1.6-1.8  creatinine 2.0 today,   - Monitor UO, BUN/Cr, volume status, acid-base balance, electrolytes   - avoid nephrotoxic agents; appropriate dose adjustments for all renally cleared medications   - judicious IVF + electrolyte supplementation as needed to maintain goal.
baseline Cr 1.6-1.8  creatinine 2.0 today,   - Monitor UO, BUN/Cr, volume status, acid-base balance, electrolytes   - avoid nephrotoxic agents; appropriate dose adjustments for all renally cleared medications   - judicious IVF + electrolyte supplementation as needed to maintain goal.

## 2022-08-10 NOTE — PROGRESS NOTE ADULT - PROBLEM SELECTOR PROBLEM 6
HTN (hypertension)
HTN (hypertension)
CAD (coronary artery disease)
HLD (hyperlipidemia)
CAD (coronary artery disease)
CAD (coronary artery disease)

## 2022-08-10 NOTE — PROGRESS NOTE ADULT - SUBJECTIVE AND OBJECTIVE BOX
EP Attending  HISTORY OF PRESENT ILLNESS: HPI:  81 yo m w pmh htn, hld, cad s/p cabg, ckd (baseline cr 1.6-1.8), prostate ca p/w "penile pain" that began all of a sudden this morning. sharp, 10/10 pain in intensity. patient has never had such a pain before, denies it radiating to suprapubic or flank or lower back regions. states pain was associated with no urine output. as pain persisted, patient grew concerned, and made his way to ER for further mgmt.   in ER, found to have urinary retention with bladder distended from ~500 cc urine; casas inserted with immediate resolution of pain.  patient was being prepped for discharge from ER with casas, to follow up outpatient urology for further mgmt, however, HR noted to be as low as 30/min. 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 ER did not feel comfortable discharging patient, so plan made to admit to medicine for further evaluation (04 Aug 2022 01:17)    Patient, spouse and daughter are concerned- to their knowlege his ejection fraction has been normal, but at this rate has been abnormal for many months on appropriate CHF GDMT.  There is limited beta blockers being used at this time due to сергей-arrhythmia and no ACE/ARBs due to renal dysfunction.  Referred to EP for LV EF <35%, ischemic cardiomyopathy, and bifascicular block, as well as bradycardia in a patient who needs beta blockers.  He has NYHA II-III level of function at home, but no recent chf hospitalizations.  No palpitations or fainting, no known history of AFib.  A 10 pt ROS is otherwise negative.    Date of service 8/10- resting comfortably.  Awaiting coronary angiogram. No new complaints.    acetaminophen     Tablet .. 650 milliGRAM(s) Oral every 6 hours PRN  aluminum hydroxide/magnesium hydroxide/simethicone Suspension 30 milliLiter(s) Oral every 4 hours PRN  ARIPiprazole 5 milliGRAM(s) Oral daily  aspirin  chewable 81 milliGRAM(s) Oral daily  atorvastatin 80 milliGRAM(s) Oral at bedtime  atropine Injectable 0.5 milliGRAM(s) IV Push once PRN  carvedilol 3.125 milliGRAM(s) Oral every 12 hours  chlorhexidine 2% Cloths 1 Application(s) Topical <User Schedule>  clonazePAM  Tablet 1 milliGRAM(s) Oral at bedtime  doxazosin 4 milliGRAM(s) Oral at bedtime  enoxaparin Injectable 30 milliGRAM(s) SubCutaneous every 24 hours  hydrALAZINE 25 milliGRAM(s) Oral three times a day  melatonin 3 milliGRAM(s) Oral at bedtime PRN  ondansetron Injectable 4 milliGRAM(s) IV Push every 8 hours PRN  polyethylene glycol 3350 17 Gram(s) Oral daily                            13.1   7.93  )-----------( 156      ( 10 Aug 2022 08:26 )             39.1       08-10    136  |  103  |  71<H>  ----------------------------<  107<H>  4.3   |  19<L>  |  2.12<H>    Ca    8.7      10 Aug 2022 08:26    T(C): 36.7 (08-10-22 @ 04:00), Max: 36.7 (08-09-22 @ 17:13)  HR: 90 (08-10-22 @ 04:00) (86 - 102)  BP: 110/66 (08-10-22 @ 04:00) (110/66 - 130/85)  RR: 18 (08-10-22 @ 04:00) (18 - 18)  SpO2: 92% (08-10-22 @ 04:00) (92% - 95%)  Wt(kg): --    I&O's Summary    09 Aug 2022 07:01  -  10 Aug 2022 07:00  --------------------------------------------------------  IN: 1020 mL / OUT: 800 mL / NET: 220 mL    General: Well nourished, no acute distress, alert and oriented x 3  Head: normocephalic, no trauma  Neck: no JVD, no bruit, supple, not enlarged  CV: S1S2, no S3, regular rate, rhythm is SINUS, no murmurs.    Lungs: clear BL, no rales or wheezes  Abdomen: bowel sounds +, soft, nontender, nondistended  Extremities: no clubbing, cyanosis or edema  Neuro: Moves all 4 extremities, sensation intact x 4 extremities  Skin: warm and moist, normal turgor  Psych: Mood and affect are appropriate for circumstances  MSK: normal range of motion and strength x4 extremities.      TELEMETRY: NSR, wide QRS 	    ECG: NSR, RBBB+LAFB, +ms. 	  Echo:   < from: TTE with Doppler (w/Cont) (08.04.22 @ 11:54) >  Dimensions:    Normal Values:  LA:     4.9    2.0 - 4.0 cm  Ao:     3.4    2.0 - 3.8 cm  SEPTUM: 1.2    0.6 - 1.2cm  PWT:    1.2    0.6 - 1.1 cm  LVIDd:  5.6    3.0 - 5.6 cm  LVIDs:  3.6    1.8 - 4.0 cm  Derived variables:  LVMI: 140 g/m2  RWT: 0.42  Fractional short: 36 %  EF (Jacques Rule): 30 %Doppler Peak Velocity (m/sec):  AoV=1.7  ------------------------------------------------------------------------  Observations:  Mitral Valve: Normal mitral valve. Minimal mitral  regurgitation.  Aortic Valve/Aorta: Calcified aortic valve. Peak  transaortic valve gradient equals 12 mm Hg. Mild aortic  regurgitation.  Peak left ventricular outflow tract  gradient equals 4 mm Hg, mean gradient is equal to 1 mm Hg,  LVOT velocity time integral equals 24 cm.  Normal aortic root size. (Ao: 3.4 cm at the sinuses of  Valsalva).  Left Atrium: Severely dilated left atrium.  LA volume index  = 57 cc/m2.  Left Ventricle: Endocardial visualization enhanced with  intravenous injection of Ultrasonic Enhancing Agent  (Lumason). Severe segmental left ventricular systolic  dysfunction. LVEF calculated using biplane Jacques's method  was 30%. Hypokinesis of the anterolateral, inferolateral,  and anterior wall. No left ventricular thrombus. Normal  left ventricular internal dimensions and wall thicknesses.  Mild diastolic dysfunction (Stage I).  Right Heart: Normal rightatrium. Normal right ventricular  size and function. Normal tricuspid valve. Minimal  tricuspid regurgitation. Normal pulmonic valve.  Pericardium/Pleura: No pericardial effusion seen.  Hemodynamic: Estimated right ventricular systolic pressure  equals 24 mm Hg, assuming right atrial pressure equals 3 mm  Hg, consistent with normal pulmonary pressures. Color  Doppler demonstrates no evidence of a patent foramen ovale.    < end of copied text >    ASSESSMENT/PLAN: 	82y Male with HTN, CKD III and prior CABG (unknown if prior MI), here with penile pain / urinary retention.  Has longstanding LV EF ~30%, but allegedly had LVEF closer to 55-60% when he had bypass surgery a few years ago.  No recent ischemic workup, and he is being referred for coronary angiogram when kidney function stabilizes.  If PCI performed (lateral wall hypokinetic on Echo, looking for native circumflex or SVG-->OM ), then LV EF should be reassessed in 3 months.  Following off of antihypertensives due to acute kidney injury.  If no PCI performed, he is eligible for a biventricular ICD implant for sinus node dysfunction / CHF+CAD requiring beta blockers, bifascicular block, and chronic low LV EF%.  Will follow.  Pending results of Coronary Angio.      Floyd Mckeon M.D.  Cardiac Electrophysiology    office [phone]  pager [phone]

## 2022-08-10 NOTE — PROGRESS NOTE ADULT - PROBLEM SELECTOR PLAN 6
s/p CABG 7 yrs ago  - asymptomatic, trop negative, no ekg findings suggestive of acute ischemia  - continue home asa, statin  - LHC cancelled as above
s/p CABG 7 yrs ago  - asymptomatic, trop negative, no ekg findings suggestive of acute ischemia  - continue home asa, statin; hold AVN blockers for now given bradyarrhythmia.  - [facility] as above
continue home rosuvastatin 20
- d/c clonidine   - on spironolactone and entresto
- d/c clonidine   - on spironolactone and entresto.
s/p CABG 7 yrs ago  - asymptomatic, trop negative, no ekg findings suggestive of acute ischemia  - continue home asa, statin; hold AVN blockers for now given bradyarrhythmia.  - [facility] as above

## 2022-08-10 NOTE — PROGRESS NOTE ADULT - PROBLEM SELECTOR PLAN 7
c/w rosuvastatin 20mg PO daily.
- d/c clonidine   - holding entresto, spironolactone in setting of LOVE  - start hydralazine 25mg TID
c/w rosuvastatin 20mg PO daily

## 2022-08-10 NOTE — PROGRESS NOTE ADULT - PROBLEM SELECTOR PLAN 3
baseline Cr 1.6-1.8  creatinine 1.59 today, if worsens consider nephrology consult  adequate urine output  Monitor UO, BUN/Cr, volume status, acid-base balance, electrolytes   urine studies (UA, Cr, Lytes, Osm) + calculate FENa/FEUrea, renal/bladder US   avoid nephrotoxic agents; appropriate dose adjustments for all renally cleared medications   judicious IVF + electrolyte supplementation as needed to maintain goal.
s/p casas insertion with resolution of pain,   - kidney US with no hydronephrosis, mild renal parenchymal disease   - recommended dc planning with casas catheter but family requesting TOV as inpatient  - start TOV  - continue outpatient doxazosin 4  - outpatient urology follow up for TOV
s/p casas insertion with resolution of pain,   - kidney US with no hydronephrosis, mild renal parenchymal disease   - maintain casas catheter upon discharge  - continue outpatient doxazosin 4  - outpatient urology follow up for TOV
baseline Cr 1.6-1.8  Monitor UO, BUN/Cr, volume status, acid-base balance, electrolytes   urine studies (UA, Cr, Lytes, Osm) + calculate FENa/FEUrea, renal/bladder US   avoid nephrotoxic agents; appropriate dose adjustments for all renally cleared medications   judicious IVF + electrolyte supplementation as needed to maintain goal
baseline Cr 1.6-1.8  creatinine 1.5 today,   adequate urine output  Monitor UO, BUN/Cr, volume status, acid-base balance, electrolytes   urine studies (UA, Cr, Lytes, Osm) + calculate FENa/FEUrea, renal/bladder US   avoid nephrotoxic agents; appropriate dose adjustments for all renally cleared medications   judicious IVF + electrolyte supplementation as needed to maintain goal.
s/p casas insertion with resolution of pain,   - kidney US with no hydronephrosis, mild renal parenchymal disease   - maintain casas catheter upon discharge  - continue outpatient doxazosin 4  - outpatient urology follow up for TOV

## 2022-08-10 NOTE — DISCHARGE NOTE NURSING/CASE MANAGEMENT/SOCIAL WORK - NSDCPEFALRISK_GEN_ALL_CORE
For information on Fall & Injury Prevention, visit: [email or web address] OR  [email or web address] OR  https://www.cdc.gov/steadi/patient.html

## 2022-08-10 NOTE — PROGRESS NOTE ADULT - PROBLEM SELECTOR PROBLEM 3
Obstructive uropathy
Stage 3 chronic kidney disease
Obstructive uropathy
Obstructive uropathy

## 2022-08-10 NOTE — PROGRESS NOTE ADULT - PROBLEM SELECTOR PROBLEM 1
Obstructive uropathy
Acute systolic heart failure
Obstructive uropathy
Acute systolic heart failure
Obstructive uropathy
Acute systolic heart failure

## 2022-08-10 NOTE — PROGRESS NOTE ADULT - ASSESSMENT
81 yo m w pmh htn, hld, cad s/p cabg 7 yrs ago in [facility], ckd (baseline cr 1.6-1.8), prostate ca s/p radiation who p/w "penile pain" In ER, found to have urinary retention with bladder distended from ~500 cc urine; casas inserted with immediate resolution of pain, and also found to be bradycardic, with new onset HFrEF

## 2022-08-10 NOTE — PROGRESS NOTE ADULT - PROBLEM SELECTOR PLAN 9
DVT ppx: lovenox sq  Dispo: home with service

## 2022-08-10 NOTE — PROGRESS NOTE ADULT - ASSESSMENT
COVERING DR. DAVIS    ASSESSMENT/PLAN: 	  83 yo m w pmh htn, hld, cad s/p cabg 7 yrs ago in [facility], ckd (baseline cr 1.6-1.8), prostate ca s/p radiation who p/w "penile pain" that began all of a sudden this morning. In ER, found to have urinary retention with bladder distended from ~500 cc urine; casas inserted with immediate resolution of pain.  patient was being prepped for discharge from ER with casas, to follow up outpatient urology for further mgmt, however, HR noted to be as low as 30/min. 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 Followed by Dr. Gagan Dixon as outpatient as PCP and primary Cardiologist.    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 Justin, that is his baseline  - Denies dizziness, lightheadedness, syncope/pre-syncope  - Denies recent ischemic eval; Hx of CAD s/p CABG 7 yrs ago yet denies CP or SOB and exhibits excellent functional capacity  - Bradycardia possibly medication induced vs. secondary to urinary retention (outpatient urology follow up for TOV .continue outpatient doxazosin 4)  -Clonidine discontinued  - TTE shows EF of 30% Hypokinesis of the anterolateral, inferolateral, and anterior wall. No left ventricular thrombus. Mild   diastolic dysfunction  -coreg d/arley 2/2 recurrent bradycardia  -currently NSR/ST 90-110s on tele. continue to monitor tele     Problem/Plan -2  Problem: HTN  - d/c all home antihypertensives  - GDMT for HFrEF coreg and Hydral   - spironolactone and entresto held 2/2 worsening creat    Problem/Plan -3  Problem: HLD  - c/w rosuvastatin 20mg PO daily    Problem/Plan -4  Problem: sHF   - TTE shows EF of 30% with hypokinesis of the anterolateral, inferolateral, and anterior wall. No left ventricular thrombus. Mild diastolic dysfunction.   - Entresto and aldactone held 2/2 worsening creat  - started on low dose Coreg 3.125mg BID but discontinued 2/2 recurrent сергей  - d/c all home antihypertensives  - talked in detail with OP cardiologist Dr Dixon and daughter and they want pt to get cath as op given LOVE, Urinary retention   - will attempt to get [facility] on d/c

## 2022-08-10 NOTE — PROGRESS NOTE ADULT - PROBLEM SELECTOR PLAN 1
s/p casas insertion with resolution of pain  pocus ed kidney with no features of hydronephrosis; does show hypoechoic focus near the inferior pole of the right kidney.  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
TTE shows EF of 30% with hypokinesis of the anterolateral, inferolateral, and anterior wall. No left ventricular thrombus. Mild diastolic dysfunction.   - initially planned for LHC today but postponed 2/2 LOVE  - hold entresto, aldactone given LOVE  - start hydralazine 25mg TID  - appreciate cardiology recs  - appreciate EP recs: pt can benefit from BiV ICD given symptomatic bradycardia, low EF, widened QRS; can be done as inpatient vs. outpatient
TTE shows EF of 30% with hypokinesis of the anterolateral, inferolateral, and anterior wall. No left ventricular thrombus. Mild diastolic dysfunction.   - pt/family refusing inpatient LHC at this time at his outpatient's cardiologist request  - hold entresto, aldactone given LOVE  - started hydralazine 25mg TID  - appreciate cardiology recs: given low EF and not proceeding with LHC at this time, will get him [facility]  - appreciate EP recs: pt can benefit from BiV ICD given symptomatic bradycardia, low EF, widened QRS; can be done as inpatient vs. outpatient
s/p casas insertion with resolution of pain,   pocus ed kidney with no features of hydronephrosis; does show hypoechoic focus near the inferior pole of the right kidney.  prior ct imaging has shown right sided nephrolith ~2 mm causing obstruction of R UVJ  RBUS no hydronephrosis, mild renal parenchymal disease   maintain casas catheter upon discharge  continue outpatient doxazosin 4  outpatient urology follow up for TOV.
s/p casas insertion with resolution of pain,   pocus ed kidney with no features of hydronephrosis; does show hypoechoic focus near the inferior pole of the right kidney.  prior ct imaging has shown right sided nephrolith ~2 mm causing obstruction of R UVJ  RBUS no hydronephrosis, mild renal parenchymal disease   maintain casas catheter upon discharge  continue outpatient doxazosin 4  outpatient urology follow up for TOV
TTE shows EF of 30% with hypokinesis of the anterolateral, inferolateral, and anterior wall. No left ventricular thrombus. Mild diastolic dysfunction.   - LHC again postponed 2/2 LOVE  - hold entresto, aldactone given LOVE  - started hydralazine 25mg TID  - appreciate cardiology recs  - appreciate EP recs: pt can benefit from BiV ICD given symptomatic bradycardia, low EF, widened QRS; can be done as inpatient vs. outpatient

## 2022-08-10 NOTE — PROGRESS NOTE ADULT - PROBLEM SELECTOR PROBLEM 4
Bradyarrhythmia
Acute systolic heart failure
CAD (coronary artery disease)
Bradyarrhythmia
Acute systolic heart failure
Bradyarrhythmia

## 2022-08-10 NOTE — DISCHARGE NOTE PROVIDER - CARE PROVIDER_API CALL
Floyd Mckeon (MD)  Cardiac Electrophysiology; Cardiovascular Disease; Internal Medicine  [address], [place] [address]  Phone: [phone]  Fax: [phone]  Follow Up Time: 1 month

## 2022-08-10 NOTE — PROGRESS NOTE ADULT - PROBLEM SELECTOR PROBLEM 2
Bradyarrhythmia
LOVE (acute kidney injury)
LOVE (acute kidney injury)
Bradyarrhythmia
Bradyarrhythmia
LOVE (acute kidney injury)

## 2022-08-10 NOTE — PROGRESS NOTE ADULT - NSPROGADDITIONALINFOA_GEN_ALL_CORE
above plans discussed with DYANA Collins  updated daughter over the phone  dc planning this evening after [facility] and JOAQUIN Oconnor MD  Division of Hospital Medicine  Contact via [employer]  Office: [phone]
above plans discussed with FRANTZ Burnham  updated daughter over the phone    Marla Oconnor MD  Division of Hospital Medicine  Contact via [employer]  Office: [phone]
above plans discussed with NP Chacha Oconnro MD  Division of Hospital Medicine  Contact via [employer] Teams  Office: [phone]

## 2022-08-10 NOTE — PROGRESS NOTE ADULT - SUBJECTIVE AND OBJECTIVE BOX
Tejinder Nava MD  Interventional Cardiology / Endovascular Specialist  [place] Office : [phone] [place] [address] [place]. [address]  Tel:   [place] Office : [address] [facility] [place] [address]  Tel: [phone]    Subjective/Overnight events: Patient lying in bed comfortably. No acute distress. Denies chest pain, SOB or palpitations  	  MEDICATIONS:  aspirin  chewable 81 milliGRAM(s) Oral daily  carvedilol 3.125 milliGRAM(s) Oral every 12 hours  doxazosin 4 milliGRAM(s) Oral at bedtime  enoxaparin Injectable 30 milliGRAM(s) SubCutaneous every 24 hours  hydrALAZINE 25 milliGRAM(s) Oral three times a day        acetaminophen     Tablet .. 650 milliGRAM(s) Oral every 6 hours PRN  ARIPiprazole 5 milliGRAM(s) Oral daily  clonazePAM  Tablet 1 milliGRAM(s) Oral at bedtime  melatonin 3 milliGRAM(s) Oral at bedtime PRN  ondansetron Injectable 4 milliGRAM(s) IV Push every 8 hours PRN    aluminum hydroxide/magnesium hydroxide/simethicone Suspension 30 milliLiter(s) Oral every 4 hours PRN  atropine Injectable 0.5 milliGRAM(s) IV Push once PRN  polyethylene glycol 3350 17 Gram(s) Oral daily    atorvastatin 80 milliGRAM(s) Oral at bedtime    chlorhexidine 2% Cloths 1 Application(s) Topical <User Schedule>      PAST MEDICAL/SURGICAL HISTORY  PAST MEDICAL & SURGICAL HISTORY:  Hypertension      Hyperlipemia      S/P CABG (coronary artery bypass graft)          SOCIAL HISTORY: Substance Use (street drugs): ( x ) never used  (  ) other:    FAMILY HISTORY:      REVIEW OF SYSTEMS:  CONSTITUTIONAL: No fever, weight loss, or fatigue  EYES: No eye pain, visual disturbances, or discharge  ENMT:  No difficulty hearing, tinnitus, vertigo; No sinus or throat pain  BREASTS: No pain, masses, or nipple discharge  GASTROINTESTINAL: No abdominal or epigastric pain. No nausea, vomiting, or hematemesis; No diarrhea or constipation. No melena or hematochezia.  GENITOURINARY: No dysuria, frequency, hematuria, or incontinence  NEUROLOGICAL: No headaches, memory loss, loss of strength, numbness, or tremors  ENDOCRINE: No heat or cold intolerance; No hair loss  MUSCULOSKELETAL: No joint pain or swelling; No muscle, back, or extremity pain  PSYCHIATRIC: No depression, anxiety, mood swings, or difficulty sleeping  HEME/LYMPH: No easy bruising, or bleeding gums  All others negative    PHYSICAL EXAM:  T(C): 36.7 (08-10-22 @ 11:32), Max: 36.7 (08-09-22 @ 17:13)  HR: 82 (08-10-22 @ 11:32) (82 - 102)  BP: 127/75 (08-10-22 @ 11:32) (110/66 - 130/85)  RR: 18 (08-10-22 @ 11:32) (18 - 18)  SpO2: 95% (08-10-22 @ 11:32) (92% - 95%)  Wt(kg): --  I&O's Summary    09 Aug 2022 07:01  -  10 Aug 2022 07:00  --------------------------------------------------------  IN: 1020 mL / OUT: 800 mL / NET: 220 mL    10 Aug 2022 07:01  -  10 Aug 2022 15:11  --------------------------------------------------------  IN: 200 mL / OUT: 600 mL / NET: -400 mL      Appearance: In no distress	  HEENT:    PERRL, EOMI	  Cardiovascular:  S1 S2, No JVD  Respiratory: Lungs clear to auscultation	  Gastrointestinal:  Soft, Non-tender, + BS	  Vasculature:  No edema of LE  Psychiatric: Appropriate affect   Neuro: no acute focal deficits                               13.1   7.93  )-----------( 156      ( 10 Aug 2022 08:26 )             39.1     08-10    136  |  103  |  71<H>  ----------------------------<  107<H>  4.3   |  19<L>  |  2.12<H>    Ca    8.7      10 Aug 2022 08:26      proBNP:   Lipid Profile:   HgA1c:   TSH:     Consultant(s) Notes Reviewed:  [x ] YES  [ ] NO    Care Discussed with Consultants/Other Providers [ x] YES  [ ] NO    Imaging Personally Reviewed independently:  [x] YES  [ ] NO    All labs, radiologic studies, vitals, orders and medications list reviewed. Patient is seen and examined at bedside. Case discussed with medical team.

## 2022-08-10 NOTE — PROGRESS NOTE ADULT - SUBJECTIVE AND OBJECTIVE BOX
Patient is a 82y old  Male who presents with a chief complaint of penile pain, unable to pass urine (10 Aug 2022 09:39)      SUBJECTIVE / OVERNIGHT EVENTS:  no acute events overnight, vss, afebrile  family spoke to his outpatient cardiologist who did not recommend LHC right now  extensive discussion held with patient and daughter as family rep - explained that LHC would be beneficial and recommended given reduced EF and WMA but family/pt wanted to follow his outpatient cardiologist's recommendation at this time who would eventually arrange LHC as outpatient  family also requesting TOV    tele reviewed: sinus 60-90s    ROS:  14 point ROS negative in detail except stated as above    MEDICATIONS  (STANDING):  ARIPiprazole 5 milliGRAM(s) Oral daily  aspirin  chewable 81 milliGRAM(s) Oral daily  atorvastatin 80 milliGRAM(s) Oral at bedtime  carvedilol 3.125 milliGRAM(s) Oral every 12 hours  chlorhexidine 2% Cloths 1 Application(s) Topical <User Schedule>  clonazePAM  Tablet 1 milliGRAM(s) Oral at bedtime  doxazosin 4 milliGRAM(s) Oral at bedtime  enoxaparin Injectable 30 milliGRAM(s) SubCutaneous every 24 hours  hydrALAZINE 25 milliGRAM(s) Oral three times a day  polyethylene glycol 3350 17 Gram(s) Oral daily    MEDICATIONS  (PRN):  acetaminophen     Tablet .. 650 milliGRAM(s) Oral every 6 hours PRN Temp greater or equal to 38C (100.4F), Mild Pain (1 - 3)  aluminum hydroxide/magnesium hydroxide/simethicone Suspension 30 milliLiter(s) Oral every 4 hours PRN Dyspepsia  atropine Injectable 0.5 milliGRAM(s) IV Push once PRN symptomatic with HR<30, hemodynamically unstable with HR<30  melatonin 3 milliGRAM(s) Oral at bedtime PRN Insomnia  ondansetron Injectable 4 milliGRAM(s) IV Push every 8 hours PRN Nausea and/or Vomiting      CAPILLARY BLOOD GLUCOSE        I&O's Summary    09 Aug 2022 07:01  -  10 Aug 2022 07:00  --------------------------------------------------------  IN: 1020 mL / OUT: 800 mL / NET: 220 mL        PHYSICAL EXAM:  Vital Signs Last 24 Hrs  T(C): 36.7 (10 Aug 2022 04:00), Max: 36.7 (09 Aug 2022 17:13)  T(F): 98 (10 Aug 2022 04:00), Max: 98.1 (09 Aug 2022 17:13)  HR: 90 (10 Aug 2022 04:00) (86 - 102)  BP: 110/66 (10 Aug 2022 04:00) (110/66 - 130/85)  BP(mean): --  RR: 18 (10 Aug 2022 04:00) (18 - 18)  SpO2: 92% (10 Aug 2022 04:00) (92% - 95%)    Parameters below as of 10 Aug 2022 04:00  Patient On (Oxygen Delivery Method): room air      GENERAL: NAD, well-developed  HEAD:  Atraumatic, Normocephalic  EYES: EOMI, PERRLA, conjunctiva and sclera clear  NECK: Supple, No JVD  CHEST/LUNG: Clear to auscultation bilaterally; No wheeze  HEART: Regular rate and rhythm; No murmurs, rubs, or gallops  ABDOMEN: Soft, Nontender, Nondistended; Bowel sounds present  EXTREMITIES:  2+ Peripheral Pulses, No clubbing, cyanosis, or edema  NEUROLOGY: AAOx3; non-focal  SKIN: No rashes or lesions    LABS:  personally reviewed                        13.1   7.93  )-----------( 156      ( 10 Aug 2022 08:26 )             39.1     08-10    136  |  103  |  71<H>  ----------------------------<  107<H>  4.3   |  19<L>  |  2.12<H>    Ca    8.7      10 Aug 2022 08:26                RADIOLOGY & ADDITIONAL TESTS:    Imaging Personally Reviewed:    Consultant(s) Notes Reviewed:  cardiology, EP    Care Discussed with Consultants/Other Providers: Dr. Alvarez (cards), Dr. Mckeon (EP)

## 2022-08-10 NOTE — DISCHARGE NOTE PROVIDER - HOSPITAL COURSE
83 yo m w pmh htn, hld, cad s/p cabg 7 yrs ago in [facility], ckd (baseline cr 1.6-1.8), prostate ca s/p radiation who p/w "penile pain" In ER, found to have urinary retention with bladder distended from ~500 cc urine; casas inserted with immediate resolution of pain, and also found to be bradycardic, with mixed cardiomyopathy HFrEF s/p GDMT >3 months.     Problem/Plan - 1:  ·  Problem: Acute systolic heart failure.   ·  Plan: TTE shows EF of 30% with hypokinesis of the anterolateral, inferolateral, and anterior wall. No left ventricular thrombus. Mild diastolic dysfunction.   - pt/family refusing inpatient LHC at this time at his outpatient's cardiologist request  - hold entresto, aldactone given LOVE  - started hydralazine 25mg TID  - appreciate cardiology recs: given low EF and not proceeding with LHC at this time, will get him Lifevest  - appreciate EP recs: pt can benefit from BiV ICD given symptomatic bradycardia, low EF, widened QRS; can be done as inpatient vs. outpatient.     Problem/Plan - 2:  ·  Problem: LOVE (acute kidney injury).   ·  Plan: SCr up from 1.5 to 2.1 yesterday, now slowly trending down  - holding entreso, aldactone  - US kidney from last week with mild parenchymal disease, no hydro  - TOV  - monitor urine output.     Problem/Plan - 3:  ·  Problem: Obstructive uropathy.   ·  Plan: s/p casas insertion with resolution of pain,   - kidney US with no hydronephrosis, mild renal parenchymal disease   - recommended dc planning with casas catheter but family requesting TOV as inpatient  - start TOV  - continue outpatient doxazosin 4  - outpatient urology follow up for TOV.     Problem/Plan - 4:  ·  Problem: Bradyarrhythmia.   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 No left ventricular thrombus., mild diastolic dysfunction  - c/w monitor on tele.  - EP recs appreciated.     Problem/Plan - 5:  ·  Problem: Stage 3 chronic kidney disease.   ·  Plan: baseline Cr 1.6-1.8  creatinine 2.0 today,   - Monitor UO, BUN/Cr, volume status, acid-base balance, electrolytes   - avoid nephrotoxic agents; appropriate dose adjustments for all renally cleared medications   - judicious IVF + electrolyte supplementation as needed to maintain goal.     Problem/Plan - 6:  ·  Problem: CAD (coronary artery disease).   ·  Plan: s/p CABG 7 yrs ago  - asymptomatic, trop negative, no ekg findings suggestive of acute ischemia  - continue home asa, statin  - C cancelled as above.     Problem/Plan - 7:  ·  Problem: HTN (hypertension).   ·  Plan: - d/c clonidine   - holding entresto, spironolactone in setting of LOVE  - hydralazine 25mg TID.     Problem/Plan - 8:  ·  Problem: HLD (hyperlipidemia).   ·  Plan: c/w rosuvastatin 20mg PO daily.     Problem/Plan - 9:  ·  Problem: Prophylactic measure.   ·  Plan: DVT ppx: lovenox sq  Dispo: home with service.   83 yo m w pmh htn, hld, cad s/p cabg 7 yrs ago in [facility], ckd (baseline cr 1.6-1.8), prostate ca s/p radiation who p/w "penile pain" In ER, found to have urinary retention with bladder distended from ~500 cc urine; casas inserted with immediate resolution of pain, and also found to be bradycardic, with mixed cardiomyopathy HFrEF s/p GDMT >3 months.     Problem/Plan - 1:  ·  Problem: Acute systolic heart failure.   ·  Plan: TTE shows EF of 30% with hypokinesis of the anterolateral, inferolateral, and anterior wall. No left ventricular thrombus. Mild diastolic dysfunction.   - pt/family refusing inpatient LHC at this time at his outpatient's cardiologist request  - hold entresto, aldactone given LOVE  - started hydralazine 25mg TID  - appreciate cardiology recs: given low EF and not proceeding with LHC at this time, will get him Lifevest  - appreciate EP recs: pt can benefit from BiV ICD given symptomatic bradycardia, low EF, widened QRS; can be done as inpatient vs. outpatient.     Problem/Plan - 2:  ·  Problem: LOVE (acute kidney injury).   ·  Plan: SCr up from 1.5 to 2.1 yesterday, now slowly trending down  - holding entreso, aldactone  - US kidney from last week with mild parenchymal disease, no hydronephrosis     Problem/Plan - 3:  ·  Problem: Obstructive uropathy.   ·  Plan: s/p casas insertion with resolution of pain,   - kidney US with no hydronephrosis, mild renal parenchymal disease   - recommended dc planning with casas catheter but family requesting TOV as inpatient  - start TOV  - continue outpatient doxazosin 4  - outpatient urology follow up for TOV.     Problem/Plan - 4:  ·  Problem: Bradyarrhythmia.   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 No left ventricular thrombus., mild diastolic dysfunction  - c/w monitor on tele.  - EP recs appreciated.     Problem/Plan - 5:  ·  Problem: Stage 3 chronic kidney disease.   ·  Plan: baseline Cr 1.6-1.8  creatinine 2.0 today,   - Monitor UO, BUN/Cr, volume status, acid-base balance, electrolytes   - avoid nephrotoxic agents; appropriate dose adjustments for all renally cleared medications   - judicious IVF + electrolyte supplementation as needed to maintain goal.     Problem/Plan - 6:  ·  Problem: CAD (coronary artery disease).   ·  Plan: s/p CABG 7 yrs ago  - asymptomatic, trop negative, no ekg findings suggestive of acute ischemia  - continue home asa, statin  - C cancelled as above.     Problem/Plan - 7:  ·  Problem: HTN (hypertension).   ·  Plan: - d/c clonidine   - holding entresto, spironolactone in setting of LOVE  - hydralazine 25mg TID.     Problem/Plan - 8:  ·  Problem: HLD (hyperlipidemia).   ·  Plan: c/w rosuvastatin 20mg PO daily.

## 2022-08-10 NOTE — DISCHARGE NOTE PROVIDER - NSDCMRMEDTOKEN_GEN_ALL_CORE_FT
amLODIPine 5 mg oral tablet: 1 tab(s) orally once a day  ARIPiprazole 5 mg oral tablet: 1 tab(s) orally once a day  aspirin 81 mg oral tablet, chewable: 1 tab(s) orally once a day  chlorthalidone 25 mg oral tablet: 1 tab(s) orally once a day  cloNIDine 0.2 mg oral tablet: orally 3 times a day  doxazosin 4 mg oral tablet: 1 tab(s) orally once a day  rosuvastatin 20 mg oral tablet: 1 tab(s) orally once a day  valsartan 160 mg oral tablet: 1 tab(s) orally once a day   ARIPiprazole 5 mg oral tablet: 1 tab(s) orally once a day  aspirin 81 mg oral tablet, chewable: 1 tab(s) orally once a day  carvedilol 3.125 mg oral tablet: 1 tab(s) orally every 12 hours  doxazosin 4 mg oral tablet: 1 tab(s) orally once a day  hydrALAZINE 25 mg oral tablet: 1 tab(s) orally 3 times a day  rosuvastatin 20 mg oral tablet: 1 tab(s) orally once a day

## 2022-08-10 NOTE — PROGRESS NOTE ADULT - PROVIDER SPECIALTY LIST ADULT
Cardiology
Cardiology
Electrophysiology
Electrophysiology
Cardiology
Hospitalist
Hospitalist
Internal Medicine
Hospitalist
Internal Medicine
Hospitalist

## 2022-08-10 NOTE — PROGRESS NOTE ADULT - PROBLEM SELECTOR PLAN 4
s/p CABG 7 yrs ago  asymptomatic, trop negative, no ekg findings suggestive of acute ischemia  continue home asa, statin; hold AVN blockers for now given bradyarrhythmia
TTE shows EF of 30% with hypokinesis of the anterolateral, inferolateral, and anterior wall. No left ventricular thrombus. Mild diastolic dysfunction.   - Entresto 49/51 BID  - Aldactone 25mg  - d/c all other antihypertensives  - cath if pt/family agreeable
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 secondary to urinary retention (outpatient urology follow up for TOV .continue outpatient doxazosin 4)  - d/arley clonidine  - TTE shows EF of 30% Hypokinesis of the anterolateral, inferolateral, and anterior wall. No left ventricular thrombus., mild diastolic dysfunction  - c/w monitor on tele.  - EP recs appreciated
TTE shows EF of 30% with hypokinesis of the anterolateral, inferolateral, and anterior wall. No left ventricular thrombus. Mild diastolic dysfunction.   - Entresto 49/51 BID  - Aldactone 25mg  - d/c all other antihypertensives  - cath on monday
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 No left ventricular thrombus., mild diastolic dysfunction  - c/w monitor on tele.  - EP recs appreciated
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 secondary to urinary retention (outpatient urology follow up for TOV .continue outpatient doxazosin 4)  - d/arley clonidine  - TTE shows EF of 30% Hypokinesis of the anterolateral, inferolateral, and anterior wall. No left ventricular thrombus., mild diastolic dysfunction  - c/w monitor on tele.  - EP recs appreciated

## 2022-08-10 NOTE — DISCHARGE NOTE PROVIDER - NSDCCPCAREPLAN_GEN_ALL_CORE_FT
PRINCIPAL DISCHARGE DIAGNOSIS  Diagnosis: Bradycardia  Assessment and Plan of Treatment:       SECONDARY DISCHARGE DIAGNOSES  Diagnosis: Urinary retention  Assessment and Plan of Treatment:

## 2022-08-10 NOTE — DISCHARGE NOTE NURSING/CASE MANAGEMENT/SOCIAL WORK - PATIENT PORTAL LINK FT
You can access the FollowMyHealth Patient Portal offered by [facility] by registering at the following website: [email or web address]. By joining [employer]’s FollowMyHealth portal, you will also be able to view your health information using other applications (apps) compatible with our system.

## 2022-08-10 NOTE — PROGRESS NOTE ADULT - PROBLEM SELECTOR PROBLEM 5
Stage 3 chronic kidney disease
HTN (hypertension)
CAD (coronary artery disease)
Stage 3 chronic kidney disease
CAD (coronary artery disease)
Stage 3 chronic kidney disease

## 2022-08-11 ENCOUNTER — TRANSCRIPTION ENCOUNTER (OUTPATIENT)
Age: 82
End: 2022-08-11

## 2023-05-01 NOTE — ED PROVIDER NOTE - NS ED ATTENDING STATEMENT MOD
no This was a shared visit with the CRISTINA. I reviewed and verified the documentation and independently performed the documented:

## 2024-01-12 NOTE — ED PROCEDURE NOTE - NS ED PROCEDURE ASSISTED BY
The procedure was performed independently Consent: The patient's consent was obtained including but not limited to risks of crusting, scabbing, blistering, scarring, darker or lighter pigmentary change, recurrence, incomplete removal and infection. Duration Of Freeze Thaw-Cycle (Seconds): 8 Number Of Freeze-Thaw Cycles: 1 freeze-thaw cycle Show Aperture Variable?: Yes Detail Level: Detailed Render Note In Bullet Format When Appropriate: No Post-Care Instructions: I reviewed with the patient in detail post-care instructions. Patient is to wear sunprotection, and avoid picking at any of the treated lesions. Pt may apply Vaseline to crusted or scabbing areas.

## 2024-04-25 ENCOUNTER — EMERGENCY (EMERGENCY)
Facility: HOSPITAL | Age: 84
LOS: 1 days | Discharge: HOME | End: 2024-04-25
Attending: EMERGENCY MEDICINE
Payer: MEDICARE

## 2024-04-25 VITALS
HEIGHT: 68 IN | SYSTOLIC BLOOD PRESSURE: 146 MMHG | RESPIRATION RATE: 16 BRPM | TEMPERATURE: 98 F | HEART RATE: 68 BPM | DIASTOLIC BLOOD PRESSURE: 82 MMHG | OXYGEN SATURATION: 96 % | WEIGHT: 190.04 LBS

## 2024-04-25 VITALS
OXYGEN SATURATION: 97 % | DIASTOLIC BLOOD PRESSURE: 86 MMHG | TEMPERATURE: 98 F | HEART RATE: 76 BPM | SYSTOLIC BLOOD PRESSURE: 138 MMHG | RESPIRATION RATE: 16 BRPM

## 2024-04-25 LAB
ALBUMIN SERPL ELPH-MCNC: 3.9 G/DL — SIGNIFICANT CHANGE UP (ref 3.3–5)
ALP SERPL-CCNC: 46 U/L — SIGNIFICANT CHANGE UP (ref 40–120)
ALT FLD-CCNC: 10 U/L — SIGNIFICANT CHANGE UP (ref 10–45)
ANION GAP SERPL CALC-SCNC: 12 MMOL/L — SIGNIFICANT CHANGE UP (ref 5–17)
APPEARANCE UR: CLEAR — SIGNIFICANT CHANGE UP
AST SERPL-CCNC: 15 U/L — SIGNIFICANT CHANGE UP (ref 10–40)
BACTERIA # UR AUTO: ABNORMAL /HPF
BASOPHILS # BLD AUTO: 0.03 K/UL — SIGNIFICANT CHANGE UP (ref 0–0.2)
BASOPHILS NFR BLD AUTO: 0.5 % — SIGNIFICANT CHANGE UP (ref 0–2)
BILIRUB SERPL-MCNC: 0.2 MG/DL — SIGNIFICANT CHANGE UP (ref 0.2–1.2)
BILIRUB UR-MCNC: NEGATIVE — SIGNIFICANT CHANGE UP
BUN SERPL-MCNC: 55 MG/DL — HIGH (ref 7–23)
CALCIUM SERPL-MCNC: 9.5 MG/DL — SIGNIFICANT CHANGE UP (ref 8.4–10.5)
CAST: 7 /LPF — HIGH (ref 0–4)
CHLORIDE SERPL-SCNC: 112 MMOL/L — HIGH (ref 96–108)
CO2 SERPL-SCNC: 18 MMOL/L — LOW (ref 22–31)
COLOR SPEC: YELLOW — SIGNIFICANT CHANGE UP
CREAT SERPL-MCNC: 1.93 MG/DL — HIGH (ref 0.5–1.3)
DIFF PNL FLD: ABNORMAL
EGFR: 34 ML/MIN/1.73M2 — LOW
EOSINOPHIL # BLD AUTO: 0.09 K/UL — SIGNIFICANT CHANGE UP (ref 0–0.5)
EOSINOPHIL NFR BLD AUTO: 1.5 % — SIGNIFICANT CHANGE UP (ref 0–6)
GLUCOSE SERPL-MCNC: 123 MG/DL — HIGH (ref 70–99)
GLUCOSE UR QL: NEGATIVE MG/DL — SIGNIFICANT CHANGE UP
HCT VFR BLD CALC: 34.7 % — LOW (ref 39–50)
HGB BLD-MCNC: 11.1 G/DL — LOW (ref 13–17)
IMM GRANULOCYTES NFR BLD AUTO: 0.5 % — SIGNIFICANT CHANGE UP (ref 0–0.9)
KETONES UR-MCNC: NEGATIVE MG/DL — SIGNIFICANT CHANGE UP
LEUKOCYTE ESTERASE UR-ACNC: ABNORMAL
LYMPHOCYTES # BLD AUTO: 1.43 K/UL — SIGNIFICANT CHANGE UP (ref 1–3.3)
LYMPHOCYTES # BLD AUTO: 23.6 % — SIGNIFICANT CHANGE UP (ref 13–44)
MCHC RBC-ENTMCNC: 28.8 PG — SIGNIFICANT CHANGE UP (ref 27–34)
MCHC RBC-ENTMCNC: 32 GM/DL — SIGNIFICANT CHANGE UP (ref 32–36)
MCV RBC AUTO: 90.1 FL — SIGNIFICANT CHANGE UP (ref 80–100)
MONOCYTES # BLD AUTO: 0.53 K/UL — SIGNIFICANT CHANGE UP (ref 0–0.9)
MONOCYTES NFR BLD AUTO: 8.8 % — SIGNIFICANT CHANGE UP (ref 2–14)
NEUTROPHILS # BLD AUTO: 3.94 K/UL — SIGNIFICANT CHANGE UP (ref 1.8–7.4)
NEUTROPHILS NFR BLD AUTO: 65.1 % — SIGNIFICANT CHANGE UP (ref 43–77)
NITRITE UR-MCNC: NEGATIVE — SIGNIFICANT CHANGE UP
NRBC # BLD: 0 /100 WBCS — SIGNIFICANT CHANGE UP (ref 0–0)
PH UR: 5 — SIGNIFICANT CHANGE UP (ref 5–8)
PLATELET # BLD AUTO: 125 K/UL — LOW (ref 150–400)
POTASSIUM SERPL-MCNC: 4.1 MMOL/L — SIGNIFICANT CHANGE UP (ref 3.5–5.3)
POTASSIUM SERPL-SCNC: 4.1 MMOL/L — SIGNIFICANT CHANGE UP (ref 3.5–5.3)
PROT SERPL-MCNC: 6.7 G/DL — SIGNIFICANT CHANGE UP (ref 6–8.3)
PROT UR-MCNC: NEGATIVE MG/DL — SIGNIFICANT CHANGE UP
RBC # BLD: 3.85 M/UL — LOW (ref 4.2–5.8)
RBC # FLD: 12.9 % — SIGNIFICANT CHANGE UP (ref 10.3–14.5)
RBC CASTS # UR COMP ASSIST: 4 /HPF — SIGNIFICANT CHANGE UP (ref 0–4)
REVIEW: SIGNIFICANT CHANGE UP
SODIUM SERPL-SCNC: 142 MMOL/L — SIGNIFICANT CHANGE UP (ref 135–145)
SP GR SPEC: 1.01 — SIGNIFICANT CHANGE UP (ref 1–1.03)
SQUAMOUS # UR AUTO: 1 /HPF — SIGNIFICANT CHANGE UP (ref 0–5)
UROBILINOGEN FLD QL: 0.2 MG/DL — SIGNIFICANT CHANGE UP (ref 0.2–1)
WBC # BLD: 6.05 K/UL — SIGNIFICANT CHANGE UP (ref 3.8–10.5)
WBC # FLD AUTO: 6.05 K/UL — SIGNIFICANT CHANGE UP (ref 3.8–10.5)
WBC UR QL: 13 /HPF — HIGH (ref 0–5)

## 2024-04-25 PROCEDURE — 87077 CULTURE AEROBIC IDENTIFY: CPT

## 2024-04-25 PROCEDURE — 80053 COMPREHEN METABOLIC PANEL: CPT

## 2024-04-25 PROCEDURE — 87186 SC STD MICRODIL/AGAR DIL: CPT

## 2024-04-25 PROCEDURE — 99283 EMERGENCY DEPT VISIT LOW MDM: CPT | Mod: 25

## 2024-04-25 PROCEDURE — 36415 COLL VENOUS BLD VENIPUNCTURE: CPT

## 2024-04-25 PROCEDURE — 81001 URINALYSIS AUTO W/SCOPE: CPT

## 2024-04-25 PROCEDURE — 51702 INSERT TEMP BLADDER CATH: CPT

## 2024-04-25 PROCEDURE — 99284 EMERGENCY DEPT VISIT MOD MDM: CPT | Mod: 25

## 2024-04-25 PROCEDURE — 85025 COMPLETE CBC W/AUTO DIFF WBC: CPT

## 2024-04-25 PROCEDURE — 51705 CHANGE OF BLADDER TUBE: CPT

## 2024-04-25 PROCEDURE — 87086 URINE CULTURE/COLONY COUNT: CPT

## 2024-04-25 RX ORDER — CEFPODOXIME PROXETIL 100 MG
1 TABLET ORAL
Qty: 20 | Refills: 0
Start: 2024-04-25 | End: 2024-05-04

## 2024-04-25 NOTE — ED PROVIDER NOTE - PATIENT PORTAL LINK FT
You can access the FollowMyHealth Patient Portal offered by [facility] by registering at the following website: [email or web address]. By joining [employer]’s FollowMyHealth portal, you will also be able to view your health information using other applications (apps) compatible with our system.

## 2024-04-25 NOTE — ED PROVIDER NOTE - CHILD ABUSE FACILITY
From: Vasile Harp  To: Vasile Golden  Sent: 4/7/2021 4:09 PM CDT  Subject: Non-Urgent Medical Question    attached is a sample of blood pressure readings,  I would hope that with weight loss, exercise that I could eliminate one or both  blood pressure medications. something to talk about, goal set, ect ect  Vasile Harp   [facility]

## 2024-04-25 NOTE — ED PROVIDER NOTE - IV ALTEPLASE DOOR HIDDEN
Reason for Disposition   Caller has already spoken with the PCP (or office), and has no further questions    Protocols used: NO CONTACT OR DUPLICATE CONTACT CALL-ADULT-[place]    Pt's provider directed her to ER for rectal bleeding. Pt calling to notify RN Access.
show

## 2024-04-25 NOTE — ED PROVIDER NOTE - CARE PROVIDER_API CALL
Daniel Montero  Urology  [address] [address]  Phone: [phone]  Fax: [phone]  Established Patient  Follow Up Time:

## 2024-04-25 NOTE — ED ADULT NURSE NOTE - NSFALLUNIVINTERV_ED_ALL_ED
Bed/Stretcher in lowest position, wheels locked, appropriate side rails in place/Call bell, personal items and telephone in reach/Instruct patient to call for assistance before getting out of bed/chair/stretcher/Non-slip footwear applied when patient is off stretcher/[place] to call system/Physically safe environment - no spills, clutter or unnecessary equipment/Purposeful proactive rounding/Room/bathroom lighting operational, light cord in reach

## 2024-04-25 NOTE — ED ADULT NURSE NOTE - OBJECTIVE STATEMENT
83 y.o male, A&Ox4, PMH HTN, HLD, CAD, CKD, prostate cancer, pt presents to ED c/o urinary retention. pt states he has not been able to urinate properly for 2 days, pt endorsing suprapubic pain due to the retention. pt denies blood in urine, fevers, chills, sob, N/V/D, chest pain. pt safety and comfort provided.

## 2024-04-25 NOTE — ED PROVIDER NOTE - NSFOLLOWUPINSTRUCTIONS_ED_ALL_ED_FT
You are seen in the emergency department due to inability urinate for 2 days.  In the emergency department we placed a Munoz catheter and were able to drain your bladder.  We also performed lab tests for your blood and urine which showed no signs of any acute kidney injury or urinary tract infection.  You are safe for discharge.  We recommend you follow-up with your urologist within the next 2 days for further management of your symptoms.  Please return to the department if experience inability urinate, pain with urination, suprapubic pain, fevers, chills, body aches, back pain. You are seen in the emergency department due to inability urinate for 2 days.  In the emergency department we placed a Munoz catheter and were able to drain your bladder.  We also performed lab tests for your blood and urine which showed no signs of any acute kidney injury or urinary tract infection.  You are safe for discharge.  We recommend you follow-up with your urologist within the next 2 days for further management of your symptoms.  Please return to the department if experience inability urinate, pain with urination, suprapubic pain, fevers, chills, body aches, back pain. Please follow up with Dr. Montero, a urologist with [facility], within the next week for further management. Take the cefpodoxime 200mg twice per day for the next 10 days.

## 2024-04-25 NOTE — ED PROVIDER NOTE - OBJECTIVE STATEMENT
83-year-old male past medical history of hypertension, upper lipidemia CAD with CABG, CKD, prostate cancer presenting emergency department due to suprapubic abdominal pain and inability urinate for 2 days.  Patient states he has had the symptoms in the past and has needed a Munoz.  Patient denies hematuria or use of anticoagulants.  No fevers, chills, body aches, nausea, vomiting.

## 2024-04-25 NOTE — ED POST DISCHARGE NOTE - RESULT SUMMARY
spoke with patients wife. states medication that was prescribed today (cefpodoxime) was not sent to [facility] but to the wrong pharmacy. issue corrected. medication now sent over to correct pharmacy. Silas De Leon PA-C.

## 2024-04-25 NOTE — ED PROVIDER NOTE - CLINICAL SUMMARY MEDICAL DECISION MAKING FREE TEXT BOX
Les 83-year-old male history of prostate cancer in remote past on doxazosin history of hypertension hyperlipidemia with recent urinary retention seen by Dr. Cerrato in urology clinic placed the catheter last week removed 2 days ago with inability to void since then no fever no hematuria patient denies being on any antiplatelet or AC no difficulty with constipation able to move bowels on examination mild distention of bladder to below umbilicus hemodynamically stable 16 [place] silicone catheter placed patient with partial paraphimosis inability to retract foreskin completely chronic as per son 16 [place] silicone catheter placed with clear urine return no hematuria no clots will check CMP against patient's baseline CKD send off UA to evaluate for infection no clinical signs DC with leg bag and follow-up with urology

## 2024-04-26 NOTE — ED ADULT NURSE NOTE - NSFALLOOBATTEMPT_ED_ALL_ED
[Restricted in physically strenuous activity but ambulatory and able to carry out work of a light or sedentary nature] : Status 1- Restricted in physically strenuous activity but ambulatory and able to carry out work of a light or sedentary nature, e.g., light house work, office work [Normal] : affect appropriate No

## 2024-04-28 LAB
-  AMOXICILLIN/CLAVULANIC ACID: SIGNIFICANT CHANGE UP
-  AMPICILLIN/SULBACTAM: SIGNIFICANT CHANGE UP
-  AMPICILLIN: SIGNIFICANT CHANGE UP
-  AZTREONAM: SIGNIFICANT CHANGE UP
-  CEFAZOLIN: SIGNIFICANT CHANGE UP
-  CEFEPIME: SIGNIFICANT CHANGE UP
-  CEFOXITIN: SIGNIFICANT CHANGE UP
-  CEFTRIAXONE: SIGNIFICANT CHANGE UP
-  CEFUROXIME: SIGNIFICANT CHANGE UP
-  CIPROFLOXACIN: SIGNIFICANT CHANGE UP
-  ERTAPENEM: SIGNIFICANT CHANGE UP
-  GENTAMICIN: SIGNIFICANT CHANGE UP
-  IMIPENEM: SIGNIFICANT CHANGE UP
-  LEVOFLOXACIN: SIGNIFICANT CHANGE UP
-  MEROPENEM: SIGNIFICANT CHANGE UP
-  NITROFURANTOIN: SIGNIFICANT CHANGE UP
-  PIPERACILLIN/TAZOBACTAM: SIGNIFICANT CHANGE UP
-  TOBRAMYCIN: SIGNIFICANT CHANGE UP
-  TRIMETHOPRIM/SULFAMETHOXAZOLE: SIGNIFICANT CHANGE UP
METHOD TYPE: SIGNIFICANT CHANGE UP

## 2024-04-29 LAB
-  AMOXICILLIN/CLAVULANIC ACID: SIGNIFICANT CHANGE UP
-  AMPICILLIN/SULBACTAM: SIGNIFICANT CHANGE UP
-  AMPICILLIN: SIGNIFICANT CHANGE UP
-  AZTREONAM: SIGNIFICANT CHANGE UP
-  CEFAZOLIN: SIGNIFICANT CHANGE UP
-  CEFEPIME: SIGNIFICANT CHANGE UP
-  CEFOXITIN: SIGNIFICANT CHANGE UP
-  CEFTRIAXONE: SIGNIFICANT CHANGE UP
-  CIPROFLOXACIN: SIGNIFICANT CHANGE UP
-  ERTAPENEM: SIGNIFICANT CHANGE UP
-  GENTAMICIN: SIGNIFICANT CHANGE UP
-  LEVOFLOXACIN: SIGNIFICANT CHANGE UP
-  MEROPENEM: SIGNIFICANT CHANGE UP
-  NITROFURANTOIN: SIGNIFICANT CHANGE UP
-  PIPERACILLIN/TAZOBACTAM: SIGNIFICANT CHANGE UP
-  TOBRAMYCIN: SIGNIFICANT CHANGE UP
-  TRIMETHOPRIM/SULFAMETHOXAZOLE: SIGNIFICANT CHANGE UP
CULTURE RESULTS: ABNORMAL
METHOD TYPE: SIGNIFICANT CHANGE UP
ORGANISM # SPEC MICROSCOPIC CNT: ABNORMAL
SPECIMEN SOURCE: SIGNIFICANT CHANGE UP

## 2024-05-27 ENCOUNTER — EMERGENCY (EMERGENCY)
Facility: HOSPITAL | Age: 84
LOS: 1 days | Discharge: HOME | End: 2024-05-27
Attending: PERSONAL EMERGENCY RESPONSE ATTENDANT
Payer: MEDICARE

## 2024-05-27 VITALS
RESPIRATION RATE: 20 BRPM | SYSTOLIC BLOOD PRESSURE: 172 MMHG | DIASTOLIC BLOOD PRESSURE: 76 MMHG | HEART RATE: 61 BPM | WEIGHT: 179.9 LBS | OXYGEN SATURATION: 95 % | HEIGHT: 68 IN | TEMPERATURE: 98 F

## 2024-05-27 VITALS
RESPIRATION RATE: 18 BRPM | DIASTOLIC BLOOD PRESSURE: 78 MMHG | SYSTOLIC BLOOD PRESSURE: 150 MMHG | OXYGEN SATURATION: 96 % | HEART RATE: 62 BPM | TEMPERATURE: 98 F

## 2024-05-27 LAB
ALBUMIN SERPL ELPH-MCNC: 3.9 G/DL — SIGNIFICANT CHANGE UP (ref 3.3–5)
ALP SERPL-CCNC: 51 U/L — SIGNIFICANT CHANGE UP (ref 40–120)
ALT FLD-CCNC: 51 U/L — HIGH (ref 10–45)
AMORPH SED URNS QL MICRO: PRESENT
ANION GAP SERPL CALC-SCNC: 12 MMOL/L — SIGNIFICANT CHANGE UP (ref 5–17)
APPEARANCE UR: CLEAR — SIGNIFICANT CHANGE UP
AST SERPL-CCNC: 30 U/L — SIGNIFICANT CHANGE UP (ref 10–40)
BACTERIA # UR AUTO: NEGATIVE /HPF — SIGNIFICANT CHANGE UP
BASOPHILS # BLD AUTO: 0.02 K/UL — SIGNIFICANT CHANGE UP (ref 0–0.2)
BASOPHILS NFR BLD AUTO: 0.3 % — SIGNIFICANT CHANGE UP (ref 0–2)
BILIRUB SERPL-MCNC: 0.2 MG/DL — SIGNIFICANT CHANGE UP (ref 0.2–1.2)
BILIRUB UR-MCNC: NEGATIVE — SIGNIFICANT CHANGE UP
BUN SERPL-MCNC: 35 MG/DL — HIGH (ref 7–23)
CALCIUM SERPL-MCNC: 9.4 MG/DL — SIGNIFICANT CHANGE UP (ref 8.4–10.5)
CAST: 1 /LPF — SIGNIFICANT CHANGE UP (ref 0–4)
CHLORIDE SERPL-SCNC: 106 MMOL/L — SIGNIFICANT CHANGE UP (ref 96–108)
CO2 SERPL-SCNC: 23 MMOL/L — SIGNIFICANT CHANGE UP (ref 22–31)
COLOR SPEC: YELLOW — SIGNIFICANT CHANGE UP
CREAT SERPL-MCNC: 1.69 MG/DL — HIGH (ref 0.5–1.3)
DIFF PNL FLD: ABNORMAL
EGFR: 40 ML/MIN/1.73M2 — LOW
EOSINOPHIL # BLD AUTO: 0.12 K/UL — SIGNIFICANT CHANGE UP (ref 0–0.5)
EOSINOPHIL NFR BLD AUTO: 2.1 % — SIGNIFICANT CHANGE UP (ref 0–6)
GLUCOSE SERPL-MCNC: 121 MG/DL — HIGH (ref 70–99)
GLUCOSE UR QL: NEGATIVE MG/DL — SIGNIFICANT CHANGE UP
HCT VFR BLD CALC: 31.6 % — LOW (ref 39–50)
HGB BLD-MCNC: 10.7 G/DL — LOW (ref 13–17)
IMM GRANULOCYTES NFR BLD AUTO: 0.7 % — SIGNIFICANT CHANGE UP (ref 0–0.9)
KETONES UR-MCNC: NEGATIVE MG/DL — SIGNIFICANT CHANGE UP
LEUKOCYTE ESTERASE UR-ACNC: ABNORMAL
LYMPHOCYTES # BLD AUTO: 1.27 K/UL — SIGNIFICANT CHANGE UP (ref 1–3.3)
LYMPHOCYTES # BLD AUTO: 22.2 % — SIGNIFICANT CHANGE UP (ref 13–44)
MCHC RBC-ENTMCNC: 30 PG — SIGNIFICANT CHANGE UP (ref 27–34)
MCHC RBC-ENTMCNC: 33.9 GM/DL — SIGNIFICANT CHANGE UP (ref 32–36)
MCV RBC AUTO: 88.5 FL — SIGNIFICANT CHANGE UP (ref 80–100)
MONOCYTES # BLD AUTO: 0.44 K/UL — SIGNIFICANT CHANGE UP (ref 0–0.9)
MONOCYTES NFR BLD AUTO: 7.7 % — SIGNIFICANT CHANGE UP (ref 2–14)
NEUTROPHILS # BLD AUTO: 3.83 K/UL — SIGNIFICANT CHANGE UP (ref 1.8–7.4)
NEUTROPHILS NFR BLD AUTO: 67 % — SIGNIFICANT CHANGE UP (ref 43–77)
NITRITE UR-MCNC: NEGATIVE — SIGNIFICANT CHANGE UP
NRBC # BLD: 0 /100 WBCS — SIGNIFICANT CHANGE UP (ref 0–0)
PH UR: 6 — SIGNIFICANT CHANGE UP (ref 5–8)
PLATELET # BLD AUTO: 150 K/UL — SIGNIFICANT CHANGE UP (ref 150–400)
POTASSIUM SERPL-MCNC: 4.2 MMOL/L — SIGNIFICANT CHANGE UP (ref 3.5–5.3)
POTASSIUM SERPL-SCNC: 4.2 MMOL/L — SIGNIFICANT CHANGE UP (ref 3.5–5.3)
PROT SERPL-MCNC: 6.4 G/DL — SIGNIFICANT CHANGE UP (ref 6–8.3)
PROT UR-MCNC: NEGATIVE MG/DL — SIGNIFICANT CHANGE UP
RBC # BLD: 3.57 M/UL — LOW (ref 4.2–5.8)
RBC # FLD: 12.9 % — SIGNIFICANT CHANGE UP (ref 10.3–14.5)
RBC CASTS # UR COMP ASSIST: 2 /HPF — SIGNIFICANT CHANGE UP (ref 0–4)
REVIEW: SIGNIFICANT CHANGE UP
SODIUM SERPL-SCNC: 141 MMOL/L — SIGNIFICANT CHANGE UP (ref 135–145)
SP GR SPEC: 1.01 — SIGNIFICANT CHANGE UP (ref 1–1.03)
SQUAMOUS # UR AUTO: 0 /HPF — SIGNIFICANT CHANGE UP (ref 0–5)
UROBILINOGEN FLD QL: 0.2 MG/DL — SIGNIFICANT CHANGE UP (ref 0.2–1)
WBC # BLD: 5.72 K/UL — SIGNIFICANT CHANGE UP (ref 3.8–10.5)
WBC # FLD AUTO: 5.72 K/UL — SIGNIFICANT CHANGE UP (ref 3.8–10.5)
WBC UR QL: 3 /HPF — SIGNIFICANT CHANGE UP (ref 0–5)

## 2024-05-27 PROCEDURE — 51702 INSERT TEMP BLADDER CATH: CPT

## 2024-05-27 PROCEDURE — 81001 URINALYSIS AUTO W/SCOPE: CPT

## 2024-05-27 PROCEDURE — 99284 EMERGENCY DEPT VISIT MOD MDM: CPT

## 2024-05-27 PROCEDURE — 87086 URINE CULTURE/COLONY COUNT: CPT

## 2024-05-27 PROCEDURE — 80053 COMPREHEN METABOLIC PANEL: CPT

## 2024-05-27 PROCEDURE — 51703 INSERT BLADDER CATH COMPLEX: CPT

## 2024-05-27 PROCEDURE — 36000 PLACE NEEDLE IN VEIN: CPT

## 2024-05-27 PROCEDURE — 85025 COMPLETE CBC W/AUTO DIFF WBC: CPT

## 2024-05-27 PROCEDURE — 99284 EMERGENCY DEPT VISIT MOD MDM: CPT | Mod: 25

## 2024-05-27 RX ORDER — LIDOCAINE HCL 20 MG/ML
10 VIAL (ML) INJECTION ONCE
Refills: 0 | Status: COMPLETED | OUTPATIENT
Start: 2024-05-27 | End: 2024-05-27

## 2024-05-27 RX ADMIN — Medication 10 MILLILITER(S): at 21:13

## 2024-05-27 NOTE — ED ADULT NURSE NOTE - OBJECTIVE STATEMENT
84 year old male comes in with his wife for urinary retention [id number] Bladder scan 400 cc IV placed and blood sent Pt s/p TURP on 5/17.  Pt sates he needs [facility] catheter coude 16 [place] used unable to get placed  Pt has no  other symptoms Claudia

## 2024-05-27 NOTE — ED PROVIDER NOTE - PATIENT PORTAL LINK FT
You can access the FollowMyHealth Patient Portal offered by [facility] by registering at the following website: [email or web address]. By joining [facility]’s FollowMyHealth portal, you will also be able to view your health information using other applications (apps) compatible with our system.

## 2024-05-27 NOTE — ED PROVIDER NOTE - CLINICAL SUMMARY MEDICAL DECISION MAKING FREE TEXT BOX
84-year-old male with a history of CAD status post CABG, HTN, HLD, prostate CA status post radiation in remission, likely BPH, presents with urinary retention.  Patient unable to urinate since 3 AM this morning.  Initially was seen a month ago for an episode of urinary retention left ED with a Munoz placed and follow-up with urology, 1 week status post TURP.  Patient has been urinating normally since procedure up until this morning.  Denying any abdominal pain, distention, back pain, fever/chills, no hematuria leading up to episode of retention today.  AVSS, patient well-appearing, abdomen soft, ND NT, no suprapubic TTP, no CVA TTP B/L.  Concern for urinary retention, rule out UTI, given lack of abdominal pain and distention, will assess for kidney function as expected some discomfort with a 12+ hours patient has not urinated.  Will get labs, UA, bladder scan, Munoz placement and reassess. 84-year-old male with a history of CAD status post CABG, HTN, HLD, prostate CA status post radiation in remission, likely BPH, presents with urinary retention.  Patient unable to urinate since 3 AM this morning.  Initially was seen a month ago for an episode of urinary retention left ED with a Munoz placed and follow-up with urology, 1 week status post TURP.  Patient has been urinating normally since procedure up until this morning.  Denying any abdominal pain, distention, back pain, fever/chills, no hematuria leading up to episode of retention today.  AVSS, patient well-appearing, abdomen soft, ND NT, no suprapubic TTP, no CVA TTP B/L.  Concern for urinary retention, rule out UTI, given lack of abdominal pain and distention, will assess for kidney function as expected some discomfort with a 12+ hours patient has not urinated.  Will get labs, UA, bladder scan, Munoz placement and reassess.    Attending MD Pastor.  Agree with above.  Pt well appearing with urinary retention.  Planned screening urine, renal function.  Pt well appearing, NAD.  Planned urology f/u as outpt.

## 2024-05-27 NOTE — ED PROVIDER NOTE - NSFOLLOWUPINSTRUCTIONS_ED_ALL_ED_FT
You were evaluated in the emergency department for urinary retention.  Please find your results attached to this document.    Please follow-up with your urologist this week for evaluation and trial of void.    Continue all your home medications.    Please return to the emergency department if you experience any pain, bloody urine, or any other concerns such as fevers.

## 2024-05-27 NOTE — ED ADULT TRIAGE NOTE - CHIEF COMPLAINT QUOTE
urinary retention last 12 hours, had casas catheter removed 6-7 days ago after a procedure patient unsure what the procedure was for  Denies pain

## 2024-05-27 NOTE — ED ADULT NURSE REASSESSMENT NOTE - NS ED NURSE REASSESS COMMENT FT1
Received report from LEVI Hubbard. Pt is awake, alert, and speaking in full coherent sentences. AS per Sweta bladder scanner showed approx. 350 cc of urine, MD Azar made aware, urology consulted. Urology placed casas, approx 400 cc of yellow clear urine noted. Pt is resting comfortably in stretcher, side rails up and call bell within reach. Comfort and safety provided. Pt is pending lab results.

## 2024-05-27 NOTE — ED PROVIDER NOTE - PHYSICAL EXAMINATION
I will SWITCH the dose or number of times a day I take the medications listed below when I get home from the hospital:  None GENERAL: well appearing in no acute distress, non-toxic appearing  HEENT:  oral mucosa moist  CARDIAC: regular rate and rhythm, normal S1S2  PULM: normal breath sounds, clear to ascultation bilaterally, no rales, rhonchi, wheezing  GI: Abd soft, nondistended, nontender, no rebound tenderness, no guarding, no rigidity  : no CVA tenderness b/l, no suprapubic tenderness

## 2024-05-27 NOTE — PROCEDURE NOTE - ADDITIONAL PROCEDURE DETAILS
On exam, pt with very tight phimosis.    Using aseptic technique, area prepped in traditional sterile fashion, lidocaine urojet instilled into urethra, and 14F coude catheter placed without resistance. Clear yellow urine drained. 10cc sterile water placed into balloon and casas catheter secured with stat lock. pt tolerated procedure well. follow up outpatient for TOV.

## 2024-05-27 NOTE — ED PROVIDER NOTE - PROGRESS NOTE DETAILS
Mateus, PGY3: Patient signed out to my care.  Urology has placed Munoz which is currently draining.  Blood work and urine results found unremarkable.  Plan to discharge with urology follow-up.  Patient made aware of his results and understands to follow-up.  All questions answered.

## 2024-05-27 NOTE — ED PROVIDER NOTE - ATTENDING CONTRIBUTION TO CARE
Attending MD Pastor:  I performed a history and physical exam of the patient and discussed their management with the resident. I reviewed the resident's note and agree with the documented findings and plan of care. My medical decison making and observations are found above.

## 2024-05-27 NOTE — ED ADULT NURSE REASSESSMENT NOTE - NS ED NURSE REASSESS COMMENT FT1
Pt provided with leg bag, pt verbalized understanding signs and symptoms to look out for to return to the ED. Pt provided teach back method. Pt ambulatory and independent.

## 2024-05-28 LAB
CULTURE RESULTS: SIGNIFICANT CHANGE UP
SPECIMEN SOURCE: SIGNIFICANT CHANGE UP

## 2024-06-17 ENCOUNTER — EMERGENCY (EMERGENCY)
Facility: HOSPITAL | Age: 84
LOS: 1 days | Discharge: HOME | End: 2024-06-17
Attending: EMERGENCY MEDICINE
Payer: MEDICARE

## 2024-06-17 VITALS
RESPIRATION RATE: 18 BRPM | HEART RATE: 73 BPM | SYSTOLIC BLOOD PRESSURE: 157 MMHG | DIASTOLIC BLOOD PRESSURE: 85 MMHG | OXYGEN SATURATION: 96 %

## 2024-06-17 VITALS
DIASTOLIC BLOOD PRESSURE: 80 MMHG | HEIGHT: 68 IN | SYSTOLIC BLOOD PRESSURE: 138 MMHG | HEART RATE: 69 BPM | WEIGHT: 190.04 LBS | TEMPERATURE: 98 F | OXYGEN SATURATION: 96 % | RESPIRATION RATE: 18 BRPM

## 2024-06-17 LAB
ALBUMIN SERPL ELPH-MCNC: 3.7 G/DL — SIGNIFICANT CHANGE UP (ref 3.3–5)
ALP SERPL-CCNC: 62 U/L — SIGNIFICANT CHANGE UP (ref 40–120)
ALT FLD-CCNC: 25 U/L — SIGNIFICANT CHANGE UP (ref 10–45)
ANION GAP SERPL CALC-SCNC: 14 MMOL/L — SIGNIFICANT CHANGE UP (ref 5–17)
AST SERPL-CCNC: 27 U/L — SIGNIFICANT CHANGE UP (ref 10–40)
BASOPHILS # BLD AUTO: 0.04 K/UL — SIGNIFICANT CHANGE UP (ref 0–0.2)
BASOPHILS NFR BLD AUTO: 0.4 % — SIGNIFICANT CHANGE UP (ref 0–2)
BILIRUB SERPL-MCNC: 0.2 MG/DL — SIGNIFICANT CHANGE UP (ref 0.2–1.2)
BUN SERPL-MCNC: 47 MG/DL — HIGH (ref 7–23)
CALCIUM SERPL-MCNC: 9.8 MG/DL — SIGNIFICANT CHANGE UP (ref 8.4–10.5)
CHLORIDE SERPL-SCNC: 108 MMOL/L — SIGNIFICANT CHANGE UP (ref 96–108)
CO2 SERPL-SCNC: 19 MMOL/L — LOW (ref 22–31)
CREAT SERPL-MCNC: 2.01 MG/DL — HIGH (ref 0.5–1.3)
EGFR: 32 ML/MIN/1.73M2 — LOW
EOSINOPHIL # BLD AUTO: 0.13 K/UL — SIGNIFICANT CHANGE UP (ref 0–0.5)
EOSINOPHIL NFR BLD AUTO: 1.3 % — SIGNIFICANT CHANGE UP (ref 0–6)
GLUCOSE SERPL-MCNC: 109 MG/DL — HIGH (ref 70–99)
HCT VFR BLD CALC: 34.4 % — LOW (ref 39–50)
HGB BLD-MCNC: 11.3 G/DL — LOW (ref 13–17)
IMM GRANULOCYTES NFR BLD AUTO: 1.6 % — HIGH (ref 0–0.9)
LYMPHOCYTES # BLD AUTO: 1.82 K/UL — SIGNIFICANT CHANGE UP (ref 1–3.3)
LYMPHOCYTES # BLD AUTO: 17.9 % — SIGNIFICANT CHANGE UP (ref 13–44)
MCHC RBC-ENTMCNC: 30 PG — SIGNIFICANT CHANGE UP (ref 27–34)
MCHC RBC-ENTMCNC: 32.8 GM/DL — SIGNIFICANT CHANGE UP (ref 32–36)
MCV RBC AUTO: 91.2 FL — SIGNIFICANT CHANGE UP (ref 80–100)
MONOCYTES # BLD AUTO: 0.59 K/UL — SIGNIFICANT CHANGE UP (ref 0–0.9)
MONOCYTES NFR BLD AUTO: 5.8 % — SIGNIFICANT CHANGE UP (ref 2–14)
NEUTROPHILS # BLD AUTO: 7.41 K/UL — HIGH (ref 1.8–7.4)
NEUTROPHILS NFR BLD AUTO: 73 % — SIGNIFICANT CHANGE UP (ref 43–77)
NRBC # BLD: 0 /100 WBCS — SIGNIFICANT CHANGE UP (ref 0–0)
PLATELET # BLD AUTO: 169 K/UL — SIGNIFICANT CHANGE UP (ref 150–400)
POTASSIUM SERPL-MCNC: 5.3 MMOL/L — SIGNIFICANT CHANGE UP (ref 3.5–5.3)
POTASSIUM SERPL-SCNC: 5.3 MMOL/L — SIGNIFICANT CHANGE UP (ref 3.5–5.3)
PROT SERPL-MCNC: 7 G/DL — SIGNIFICANT CHANGE UP (ref 6–8.3)
RBC # BLD: 3.77 M/UL — LOW (ref 4.2–5.8)
RBC # FLD: 13.7 % — SIGNIFICANT CHANGE UP (ref 10.3–14.5)
SODIUM SERPL-SCNC: 141 MMOL/L — SIGNIFICANT CHANGE UP (ref 135–145)
WBC # BLD: 10.15 K/UL — SIGNIFICANT CHANGE UP (ref 3.8–10.5)
WBC # FLD AUTO: 10.15 K/UL — SIGNIFICANT CHANGE UP (ref 3.8–10.5)

## 2024-06-17 PROCEDURE — 99284 EMERGENCY DEPT VISIT MOD MDM: CPT | Mod: FS

## 2024-06-17 PROCEDURE — 99284 EMERGENCY DEPT VISIT MOD MDM: CPT

## 2024-06-17 PROCEDURE — 80053 COMPREHEN METABOLIC PANEL: CPT

## 2024-06-17 PROCEDURE — 85025 COMPLETE CBC W/AUTO DIFF WBC: CPT

## 2024-06-17 PROCEDURE — 36415 COLL VENOUS BLD VENIPUNCTURE: CPT

## 2024-06-17 NOTE — ED PROVIDER NOTE - OBJECTIVE STATEMENT
84-year-old male history of CAD s/p CABG, HTN, HLD, prostate cancer status postradiation 20 years ago, greenlight procedure over a month ago, has had Munoz placed in since, history of urinary retention presents to the ED D with urinary retention.  Daughter at bedside with patient noting that last night he is having trouble peeing, was able to make 250 cc of urine in the waiting room.  Denying any nausea, vomiting.  No abdominal pain, no burning with urination, no fevers no chills.

## 2024-06-17 NOTE — ED ADULT NURSE NOTE - NSFALLUNIVINTERV_ED_ALL_ED
Bed/Stretcher in lowest position, wheels locked, appropriate side rails in place/Call bell, personal items and telephone in reach/Instruct patient to call for assistance before getting out of bed/chair/stretcher/Non-slip footwear applied when patient is off stretcher/[place] to call system/Physically safe environment - no spills, clutter or unnecessary equipment/Purposeful proactive rounding/Room/bathroom lighting operational, light cord in reach

## 2024-06-17 NOTE — ED ADULT NURSE NOTE - OBJECTIVE STATEMENT
83 y/o male came to the ED with complaints of urinary rentention has not been able to urinate since 1am. History of retention, BPH prostate CA. Complaints of suprapubic pressure and distention. Denies CP, SOB, fevers, chills, flank pains, N, V, D.

## 2024-06-17 NOTE — ED PROVIDER NOTE - NSFOLLOWUPINSTRUCTIONS_ED_ALL_ED_FT
It was a pleasure caring for you today.  As discussed please follow-up with your urologist in the next few days with the Munoz.  Please take your labs with you at your next appointment.  Please return to the hospital if experience any new or worsening symptoms including nondraining Munoz, fevers or chills, abdominal pain, vomiting. IMPORTANT INSTRUCTIONS FROM Dr. TAYLOR:    Please follow up with your personal medical doctor in 24-48 hours. See your urologist tomorrow.  Bring results from today to your visit.    If you were advised to take any medications - be sure to review the package insert.    If your symptoms change, get worse or if you have any new symptoms, come to the ER right away.  If you have any questions, call the ER at the phone number on this page.

## 2024-06-17 NOTE — ED PROVIDER NOTE - CLINICAL SUMMARY MEDICAL DECISION MAKING FREE TEXT BOX
84-year-old male history of CAD s/p CABG, HTN, HLD, prostate cancer status postradiation 20 years ago, greenlight procedure over a month ago, has had Munoz placed in since, history of urinary retention presents to the ED D with urinary retention.  Daughter at bedside with patient noting that last night he is having trouble peeing, was able to make 250 cc of urine in the waiting room.  Denying any nausea, vomiting.  No abdominal pain, no burning with urination, no fevers no chills.    Will get labs, bladder scan, UA and reassess.

## 2024-06-17 NOTE — ED PROVIDER NOTE - PROGRESS NOTE DETAILS
pt  and son do not want to wait here in ER to give urine sample. now state that he has appt w uro in office tomorrow and wants to cont his eval there. gave lab results and dw him next steps.  questions answered and pt understands, advised return precautions and care plan.

## 2024-06-17 NOTE — ED PROVIDER NOTE - RAPID ASSESSMENT
85 y/o male with pmhx of CAD status post CABG, HTN, HLD, prostate CA status post radiation in remission, BPH presents to the ed with urinary retention. States that he has been unable to urinate since 1 am. Reports suprapubic pressure and abdominal distension. States that this happens to him frequently. Would like to have casas catheter placed and f/u outpatient with his urologist. No fevers, chills. No nausea, vomiting. Eating/drinking well. Recently had prostate procedure but was told that it would take several weeks to be effective.     Patient was rapidly assessed via a telemedicine and/or role of Quick Triage DYANA Saunders. A limited history and assessment was performed. The patient will be seen and further evaluated in the main emergency department. The remainder of care and evaluation will be conducted by the primary emergency medicine team. Receiving team will follow up on labs, imaging and serially reassess patient as indicated. All further decisions regarding patient care, evaluation and disposition are at the discretion of the receiving primary emergency department team.

## 2024-06-17 NOTE — ED PROVIDER NOTE - PATIENT PORTAL LINK FT
You can access the FollowMyHealth Patient Portal offered by [facility] by registering at the following website: [email or web address]. By joining [facility]’s FollowMyHealth portal, you will also be able to view your health information using other applications (apps) compatible with our system.

## 2024-06-17 NOTE — ED ADULT NURSE REASSESSMENT NOTE - NS ED NURSE REASSESS COMMENT FT1
Pt and family explained the importance of the urine sample. Pt and family do not want to wait and have a urologist appointment tomorrow. MD Tamayo aware and ok to dc.

## 2024-06-17 NOTE — ED PROVIDER NOTE - ATTENDING CONTRIBUTION TO CARE
This is a 84-year-old gentleman who recently had a greenlight procedure/laser of his prostate and now states that he is not peeing very much.  Notably he did urinate in the waiting room bathroom.  Awake alert talking no acute distress.  No abdominal tenderness.  No abdominal distention.  Bladder scan was performed by the nurse at the bedside and the patient was found to have less than 150 cc.  His creatinine is approximately at the baseline though the BUN is quite elevated suggesting dehydration.  Suspect that is the cause.  Will await urinalysis and culture to be sent before disposition.  CBC unremarkable.

## 2024-06-17 NOTE — ED ADULT NURSE REASSESSMENT NOTE - NS ED NURSE REASSESS COMMENT FT1
Patient stated he voided around 1700. Bladder scan done at 1845 reading 120cc. MD Tamayo and Philip made aware.

## 2024-07-28 ENCOUNTER — NON-APPOINTMENT (OUTPATIENT)
Age: 84
End: 2024-07-28

## 2024-08-20 ENCOUNTER — OUTPATIENT (OUTPATIENT)
Dept: OUTPATIENT SERVICES | Facility: HOSPITAL | Age: 84
LOS: 1 days | End: 2024-08-20
Payer: MEDICARE

## 2024-08-20 ENCOUNTER — APPOINTMENT (OUTPATIENT)
Dept: CT IMAGING | Facility: IMAGING CENTER | Age: 84
End: 2024-08-20
Payer: MEDICARE

## 2024-08-20 DIAGNOSIS — R31.29 OTHER MICROSCOPIC HEMATURIA: ICD-10-CM

## 2024-08-20 PROCEDURE — 74176 CT ABD & PELVIS W/O CONTRAST: CPT | Mod: 26,MH

## 2024-08-20 PROCEDURE — 74176 CT ABD & PELVIS W/O CONTRAST: CPT | Mod: MH

## 2024-10-04 NOTE — PROGRESS NOTE ADULT - PROBLEM SELECTOR PLAN 2
SCr up from 1.5 to 2.1 yesterday, now slowly trending down  - holding entreso, aldactone  - US kidney from last week with mild parenchymal disease, no hydro  - continue casas catheter  - monitor urine output
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 secondary to urinary retention (outpatient urology follow up for TOV .continue outpatient doxazosin 4)  - d/arley clonidine  - TTE shows EF of 30% Hypokinesis of the anterolateral, inferolateral, and anterior wall. No left ventricular thrombus., mild diastolic dysfunction  - c/w monitor on tele.
SCr up from 1.5 to 2.1 this morning  - holding entreso, aldactone  - US kidney from last week with mild parenchymal disease, no hydro  - continue casas catheter  - monitor urine output
asymptomatic, hemodynamically stable  lowest documented rate of 30/min, fluctuating between 40-60 on encounter  EKG shows LAFB + RBBB ie bifascicular block; no ST seg - T wave changes suggestive of acute ischemia, no prior EKGs to compare  1st troponin not elevated  TTE wnl  observe for symptoms, for features of hemodynamic instability  monitor for EKG/Telemetry changes  at bedside, transcutaneous pacing + atropine 0.5 mg q3-5 mins as needed if symptomatic/with hemodynamic instability with HR <30  hold avn blockers for now  Cardiology consulted, follow up recommendations
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 secondary to urinary retention (outpatient urology follow up for TOV .continue outpatient doxazosin 4)  - d/arley clonidine  - TTE shows EF of 30% Hypokinesis of the anterolateral, inferolateral, and anterior wall. No left ventricular thrombus., mild diastolid dysfunciton  - c/w monitor on tele
SCr up from 1.5 to 2.1 yesterday, now slowly trending down  - holding entreso, aldactone  - US kidney from last week with mild parenchymal disease, no hydro  - TOV  - monitor urine output
no
